# Patient Record
Sex: FEMALE | Race: WHITE | NOT HISPANIC OR LATINO | Employment: OTHER | URBAN - METROPOLITAN AREA
[De-identification: names, ages, dates, MRNs, and addresses within clinical notes are randomized per-mention and may not be internally consistent; named-entity substitution may affect disease eponyms.]

---

## 2017-10-17 ENCOUNTER — AMBULATORY - RIVER FALLS (OUTPATIENT)
Dept: FAMILY MEDICINE | Facility: CLINIC | Age: 77
End: 2017-10-17

## 2017-10-18 LAB
CHOLEST SERPL-MCNC: 156 MG/DL
CHOLEST/HDLC SERPL: 3 {RATIO}
CREAT SERPL-MCNC: 0.89 MG/DL (ref 0.6–0.93)
GLUCOSE BLD-MCNC: 113 MG/DL (ref 65–99)
HDLC SERPL-MCNC: 52 MG/DL
LDLC SERPL CALC-MCNC: 77 MG/DL
NONHDLC SERPL-MCNC: 104 MG/DL
TRIGL SERPL-MCNC: 167 MG/DL

## 2017-10-24 ENCOUNTER — OFFICE VISIT - RIVER FALLS (OUTPATIENT)
Dept: FAMILY MEDICINE | Facility: CLINIC | Age: 77
End: 2017-10-24

## 2017-10-24 ASSESSMENT — MIFFLIN-ST. JEOR: SCORE: 1392.6

## 2018-10-18 ENCOUNTER — AMBULATORY - RIVER FALLS (OUTPATIENT)
Dept: FAMILY MEDICINE | Facility: CLINIC | Age: 78
End: 2018-10-18

## 2018-10-19 LAB
CHOLEST SERPL-MCNC: 147 MG/DL
CHOLEST/HDLC SERPL: 2.9 {RATIO}
CREAT SERPL-MCNC: 0.91 MG/DL (ref 0.6–0.93)
GLUCOSE BLD-MCNC: 110 MG/DL (ref 65–99)
HBA1C MFR BLD: 6.1 %
HDLC SERPL-MCNC: 50 MG/DL
LDLC SERPL CALC-MCNC: 74 MG/DL
NONHDLC SERPL-MCNC: 97 MG/DL
TRIGL SERPL-MCNC: 152 MG/DL

## 2018-10-25 ENCOUNTER — OFFICE VISIT - RIVER FALLS (OUTPATIENT)
Dept: FAMILY MEDICINE | Facility: CLINIC | Age: 78
End: 2018-10-25

## 2018-10-25 ASSESSMENT — MIFFLIN-ST. JEOR: SCORE: 1386.47

## 2019-10-22 ENCOUNTER — AMBULATORY - RIVER FALLS (OUTPATIENT)
Dept: FAMILY MEDICINE | Facility: CLINIC | Age: 79
End: 2019-10-22

## 2019-10-23 ENCOUNTER — COMMUNICATION - RIVER FALLS (OUTPATIENT)
Dept: FAMILY MEDICINE | Facility: CLINIC | Age: 79
End: 2019-10-23

## 2019-10-23 LAB
BUN SERPL-MCNC: 19 MG/DL (ref 7–25)
BUN/CREAT RATIO - HISTORICAL: 18 (ref 6–22)
CALCIUM SERPL-MCNC: 9.8 MG/DL (ref 8.6–10.4)
CHLORIDE BLD-SCNC: 104 MMOL/L (ref 98–110)
CHOLEST SERPL-MCNC: 151 MG/DL
CHOLEST/HDLC SERPL: 3 {RATIO}
CO2 SERPL-SCNC: 26 MMOL/L (ref 20–32)
CREAT SERPL-MCNC: 1.05 MG/DL (ref 0.6–0.93)
EGFRCR SERPLBLD CKD-EPI 2021: 51 ML/MIN/1.73M2
GLUCOSE BLD-MCNC: 124 MG/DL (ref 65–99)
HBA1C MFR BLD: 6.2 %
HDLC SERPL-MCNC: 50 MG/DL
LDLC SERPL CALC-MCNC: 76 MG/DL
NONHDLC SERPL-MCNC: 101 MG/DL
POTASSIUM BLD-SCNC: 3.9 MMOL/L (ref 3.5–5.3)
SODIUM SERPL-SCNC: 140 MMOL/L (ref 135–146)
TRIGL SERPL-MCNC: 152 MG/DL

## 2019-10-29 ENCOUNTER — OFFICE VISIT - RIVER FALLS (OUTPATIENT)
Dept: FAMILY MEDICINE | Facility: CLINIC | Age: 79
End: 2019-10-29

## 2019-10-29 ASSESSMENT — MIFFLIN-ST. JEOR: SCORE: 1400.08

## 2020-09-21 ENCOUNTER — AMBULATORY - RIVER FALLS (OUTPATIENT)
Dept: FAMILY MEDICINE | Facility: CLINIC | Age: 80
End: 2020-09-21

## 2020-10-26 ENCOUNTER — AMBULATORY - RIVER FALLS (OUTPATIENT)
Dept: FAMILY MEDICINE | Facility: CLINIC | Age: 80
End: 2020-10-26

## 2020-10-27 LAB
BUN SERPL-MCNC: 20 MG/DL (ref 7–25)
BUN/CREAT RATIO - HISTORICAL: 19 (ref 6–22)
CALCIUM SERPL-MCNC: 9.6 MG/DL (ref 8.6–10.4)
CHLORIDE BLD-SCNC: 106 MMOL/L (ref 98–110)
CHOLEST SERPL-MCNC: 146 MG/DL
CHOLEST/HDLC SERPL: 2.6 {RATIO}
CO2 SERPL-SCNC: 29 MMOL/L (ref 20–32)
CREAT SERPL-MCNC: 1.03 MG/DL (ref 0.6–0.93)
EGFRCR SERPLBLD CKD-EPI 2021: 52 ML/MIN/1.73M2
GLUCOSE BLD-MCNC: 116 MG/DL (ref 65–99)
HBA1C MFR BLD: 6.1 %
HDLC SERPL-MCNC: 56 MG/DL
LDLC SERPL CALC-MCNC: 70 MG/DL
NONHDLC SERPL-MCNC: 90 MG/DL
POTASSIUM BLD-SCNC: 4.5 MMOL/L (ref 3.5–5.3)
SODIUM SERPL-SCNC: 142 MMOL/L (ref 135–146)
TRIGL SERPL-MCNC: 117 MG/DL

## 2020-10-28 ENCOUNTER — COMMUNICATION - RIVER FALLS (OUTPATIENT)
Dept: FAMILY MEDICINE | Facility: CLINIC | Age: 80
End: 2020-10-28

## 2020-11-02 ENCOUNTER — OFFICE VISIT - RIVER FALLS (OUTPATIENT)
Dept: FAMILY MEDICINE | Facility: CLINIC | Age: 80
End: 2020-11-02

## 2020-11-02 ASSESSMENT — MIFFLIN-ST. JEOR: SCORE: 1386.47

## 2021-10-28 ENCOUNTER — AMBULATORY - RIVER FALLS (OUTPATIENT)
Dept: FAMILY MEDICINE | Facility: CLINIC | Age: 81
End: 2021-10-28

## 2021-10-29 ENCOUNTER — COMMUNICATION - RIVER FALLS (OUTPATIENT)
Dept: FAMILY MEDICINE | Facility: CLINIC | Age: 81
End: 2021-10-29

## 2021-10-29 LAB
BUN SERPL-MCNC: 23 MG/DL (ref 7–25)
BUN/CREAT RATIO - HISTORICAL: 22 (ref 6–22)
CALCIUM SERPL-MCNC: 9.8 MG/DL (ref 8.6–10.4)
CHLORIDE BLD-SCNC: 103 MMOL/L (ref 98–110)
CHOLEST SERPL-MCNC: 156 MG/DL
CHOLEST/HDLC SERPL: 2.8 {RATIO}
CO2 SERPL-SCNC: 31 MMOL/L (ref 20–32)
CREAT SERPL-MCNC: 1.03 MG/DL (ref 0.6–0.93)
EGFRCR SERPLBLD CKD-EPI 2021: 52 ML/MIN/1.73M2
GLUCOSE BLD-MCNC: 106 MG/DL (ref 65–99)
HBA1C MFR BLD: 6.2 %
HDLC SERPL-MCNC: 56 MG/DL
LDLC SERPL CALC-MCNC: 76 MG/DL
NONHDLC SERPL-MCNC: 100 MG/DL
POTASSIUM BLD-SCNC: 4.5 MMOL/L (ref 3.5–5.3)
SODIUM SERPL-SCNC: 141 MMOL/L (ref 135–146)
TRIGL SERPL-MCNC: 143 MG/DL

## 2021-11-04 ENCOUNTER — TRANSFERRED RECORDS (OUTPATIENT)
Dept: MULTI SPECIALTY CLINIC | Facility: CLINIC | Age: 81
End: 2021-11-04

## 2021-11-04 ENCOUNTER — OFFICE VISIT - RIVER FALLS (OUTPATIENT)
Dept: FAMILY MEDICINE | Facility: CLINIC | Age: 81
End: 2021-11-04

## 2021-11-04 ASSESSMENT — MIFFLIN-ST. JEOR: SCORE: 1350.64

## 2022-01-04 ENCOUNTER — COMMUNICATION - RIVER FALLS (OUTPATIENT)
Dept: FAMILY MEDICINE | Facility: CLINIC | Age: 82
End: 2022-01-04

## 2022-02-12 VITALS
WEIGHT: 209 LBS | TEMPERATURE: 98.3 F | HEIGHT: 64 IN | HEART RATE: 68 BPM | BODY MASS INDEX: 35.68 KG/M2 | DIASTOLIC BLOOD PRESSURE: 81 MMHG | SYSTOLIC BLOOD PRESSURE: 132 MMHG

## 2022-02-12 VITALS
DIASTOLIC BLOOD PRESSURE: 74 MMHG | TEMPERATURE: 97.1 F | HEART RATE: 65 BPM | BODY MASS INDEX: 33.82 KG/M2 | WEIGHT: 198.1 LBS | HEIGHT: 64 IN | SYSTOLIC BLOOD PRESSURE: 178 MMHG

## 2022-02-12 VITALS
HEART RATE: 72 BPM | SYSTOLIC BLOOD PRESSURE: 130 MMHG | DIASTOLIC BLOOD PRESSURE: 72 MMHG | WEIGHT: 205.6 LBS | TEMPERATURE: 97.8 F | HEIGHT: 64 IN | BODY MASS INDEX: 35.1 KG/M2

## 2022-02-12 VITALS
HEIGHT: 64 IN | BODY MASS INDEX: 35.17 KG/M2 | DIASTOLIC BLOOD PRESSURE: 68 MMHG | TEMPERATURE: 97.9 F | HEART RATE: 72 BPM | SYSTOLIC BLOOD PRESSURE: 142 MMHG | DIASTOLIC BLOOD PRESSURE: 60 MMHG | WEIGHT: 206 LBS | SYSTOLIC BLOOD PRESSURE: 122 MMHG

## 2022-02-12 VITALS
DIASTOLIC BLOOD PRESSURE: 68 MMHG | SYSTOLIC BLOOD PRESSURE: 152 MMHG | TEMPERATURE: 97.6 F | HEIGHT: 64 IN | BODY MASS INDEX: 35.17 KG/M2 | WEIGHT: 206 LBS | HEART RATE: 76 BPM

## 2022-02-15 NOTE — NURSING NOTE
Vitals/Measurements Entered On:  10/28/2021 9:20 AM CDT    Performed On:  10/28/2021 9:20 AM CDT by Molly Doll LPN               Vital Signs   Systolic Blood Pressure :   138 mmHg (HI)    Diastolic Blood Pressure :   72 mmHg   Mean Arterial Pressure :   94 mmHg   BP Site :   Right arm   Molly Doll LPN - 10/28/2021 9:20 AM CDT

## 2022-02-15 NOTE — PROGRESS NOTES
Patient:   MENA POOL            MRN: [id number]            FIN: [id number]               Age:   77 years     Sex:  Female     :  1940   Associated Diagnoses:   Medicare annual wellness visit, subsequent; Hypertension; Mixed hyperlipidemia   Author:   Jason Barth MD      Visit Information      Date of Service: 10/24/2017 08:52 am  Performing Location: [facility]  Encounter#: [id number]      Primary Care Provider (PCP):  Jason Barth MD    NPI# [id number]   Visit type:  Medicare, annual wellness visit.    Accompanied by:  No one.    Source of history:  Self.    History limitation:  None.       Chief Complaint   10/24/2017 8:54 AM CDT   AWV, refill meds, flu shot.      Well Adult History   Well Adult History             The patient presents for Medicare well exam.  The patient's general health status is described as good.  The patient's diet is described as balanced.  Exercise: occasional.  Associated symptoms consist of none.  Last menstrual period: menopausal.  Medical encounters:.  Complaint:.  Additional pertinent history: occasional caffeine use, tobacco use none and occasional alcohol use.       mammogram:  due  Pap smear:  n/a  colonoscopy:  FIT done last year  lipid and diabetes screening:  up to date   immunizations:  up to date   other:  hypertension and hyperlipemia under control      Review of Systems   Constitutional:  No fever, No chills, No sweats, No weakness, No fatigue.    Eye:  No recent visual problem.    Ear/Nose/Mouth/Throat:  No decreased hearing, No nasal congestion, No sore throat.    Respiratory:  No shortness of breath, No cough.    Cardiovascular:  Negative, No chest pain, No palpitations, No peripheral edema.    Gastrointestinal:  No nausea, No vomiting, No diarrhea, No constipation, No heartburn.    Genitourinary:  No dysuria, No change in urine stream.    Hematology/Lymphatics:  No bruising tendency, No bleeding tendency.    Endocrine:  No cold  intolerance, No heat intolerance.    Immunologic:  Negative.    Musculoskeletal:  No back pain, No neck pain, No joint pain, No muscle pain.    Integumentary:  Rash, No dryness, No skin lesion.    Neurologic:  Alert and oriented X4,   Cognitive impairment: none  Year: [place]  [place]  [place]    Psychiatric:  No anxiety, No depression.    PHQ9 and CAGE reviewed and discussed with patient.      Hearing impairment:  none  ADL: independent  Fall risk:  low  Home safety:  no concerns    Advanced care planning:  completed      Health Status   Allergies:    Allergic Reactions (Selected)  Severity Not Documented  Nickel (No reactions were documented)   Medications:  (Selected)   Prescriptions  Prescribed  chlorthalidone 25 mg oral tablet: 1 tab(s) ( 25 mg ), PO, Daily, # 90 tab(s), 3 Refill(s), Type: Maintenance, Pharmacy: [id number], 1 tab(s) po daily  lisinopril 20 mg oral tablet: 1 tab(s) ( 20 mg ), po, daily, # 90 tab(s), 3 Refill(s), Type: Maintenance, Pharmacy: [id number], 1 tab(s) po daily  nifedipine ointment: nifedipine ointment, See Instructions, Instructions: Apply to anus tid, Supply, # 15 gm, 1 Refill(s), Type: Maintenance, Pharmacy: [facility], Apply to anus tid  simvastatin 40 mg oral tablet: 1 tab(s) ( 40 mg ), po, hs, # 90 tab(s), 3 Refill(s), Type: Maintenance, Pharmacy: [id number], 1 tab(s) po hs  Documented Medications  Documented  aspirin 81 mg oral tablet: 1 tab(s) ( 81 mg ), po, daily, 0 Refill(s)  multivitamin with minerals (w/ Iron): 0 Refill(s), Type: Maintenance   Problem list:    All Problems  BCC (basal cell carcinoma), face / ICD-9-.31 / Confirmed  BCC (basal cell carcinoma), face / ICD-9-.31 / Confirmed  Diverticulosis of Sigmoid / ICD-9-.10 / Confirmed  Dyslipidemia / SNOMED CT 416795228 / Confirmed  Obese / ICD-9-.00 / Probable  Osteopenia of the Elderly / ICD-9-.90 / Confirmed  Inactive: LE  Varicosities  Inactive: Basal Cell Carcinoma, Lip / ICD-9-.0  Resolved: Hypertension / SNOMED CT 72410155  Canceled: Elevated LDL cholesterol level / SNOMED CT 8668910066  Canceled: Family history of colonic polyps / SNOMED CT 8263931982  Canceled: High Cholesterol / ICD-9-.0  Canceled: Hyperlipidemia / SNOMED CT 515520412     Other Healthcare:         Histories   Past Medical History:    Active  Dyslipidemia (460418018)  Osteopenia of the Elderly (733.90)  Resolved  Hypertension (19173730):  Resolved.   Family History:    MI  Father ()  CVA - Cerebral infarction  Mother ()  Heart disease  Father ()  CA - Cancer of colon  Uncle (P)     Procedure history:    Extracapsular cataract extraction and insertion of intraocular lens (SNOMED CT 005163371) on 10/22/2015 at 75 Years.  Comments:  11/3/2015 9:11 AM - Jonelle Bowman  Left.  DEXA - Dual energy X-ray photon absorptiometry (SNOMED CT 715683979) on 10/15/2013 at 73 Years.  Comments:  10/25/2013 9:05 AM - Viktoriya Rojas CMA  Osteopenia in hip/spine - recheck in 3-5 yrs  Colonoscopy (SNOMED CT 366990536) on 2011 at 71 Years.  Comments:  10/17/2016 7:11 PM - Shweta Allen MA  Sedation:   MAC    2011 8:34 AM - Jason Portillo MD  Colonoscopy to the proximal ascending colon    2011 8:13 AM - Alexandria Engle MA  Normal colonoscopy, repeat in 5 years family hx colon polyps  Removal of Basal cell carcinoma from upper lip in  at 68 Years.  Colonoscopy (SNOMED CT 432882648) in  at 65 Years.  Comments:  2010 1:12 PM - Jaqueline Barrett  hyperplastic recheck 2010  [place] teeth extraction.   x2.   Social History:        Alcohol Assessment            Past      Tobacco Assessment            Past      Substance Abuse Assessment            Never      Employment and Education Assessment            Retired      Home and Environment Assessment            Marital status: .  Spouse/Partner name: Jossue.  Lives  with Spouse.        Physical Examination   Vital Signs   10/24/2017 8:54 AM CDT Temperature Tympanic 97.8 DegF  LOW    Peripheral Pulse Rate 72 bpm    Pulse Site Radial artery    HR Method Manual    Systolic Blood Pressure 150 mmHg  HI    Diastolic Blood Pressure 68 mmHg    Mean Arterial Pressure 95 mmHg    BP Site Right arm    BP Method Manual      Measurements from flowsheet : Measurements   10/24/2017 8:54 AM CDT Height Measured - Standard 64 in    Weight Measured - Standard 205.6 lb    BSA 2.05 m2    Body Mass Index 35.29 kg/m2      General:  Alert and oriented, No acute distress.    Eye:  Pupils are equal, round and reactive to light, Extraocular movements are intact, Normal conjunctiva.    HENT:  Normocephalic, Tympanic membranes are clear, Oral mucosa is moist, No pharyngeal erythema, No sinus tenderness.    Neck:  Supple, Non-tender, No carotid bruit, No lymphadenopathy, No thyromegaly.    Respiratory:  Lungs are clear to auscultation, Respirations are non-labored, Breath sounds are equal, No chest wall tenderness.    Cardiovascular:  Normal rate, Regular rhythm, No murmur, No gallop, Normal peripheral perfusion, No edema.    Gastrointestinal:  Soft, Non-tender, No organomegaly.    Musculoskeletal:  Normal range of motion, Normal strength, No swelling, No deformity.    Integumentary:  Warm, Dry, No rash.    Neurologic:  Alert, Oriented, No focal deficits.    Psychiatric:  Cooperative, Appropriate mood & affect.       Review / Management   Results review:  Lab results   10/17/2017 8:19 AM CDT Sodium Level 140 mmol/L    Potassium Level 4.1 mmol/L    Chloride Level 104 mmol/L    CO2 Level 27 mmol/L    Glucose Level 113 mg/dL  HI    BUN 21 mg/dL    Creatinine Level 0.89 mg/dL    BUN/Creat Ratio NOT APPLICABLE    eGFR 63 mL/min/1.73m2    eGFR African American 73 mL/min/1.73m2    Calcium Level 9.9 mg/dL    Cholesterol 156 mg/dL    Non-HDL Cholesterol 104    HDL 52 mg/dL    Cholesterol/HDL Ratio 3.0    LDL 77     Triglyceride 167 mg/dL  HI   .       Impression and Plan   Diagnosis     Medicare annual wellness visit, subsequent (LFO01-CP Z09).     Hypertension (MQU60-AD I10).     Mixed hyperlipidemia (OJL03-SU E78.2).     Course:  Progressing as expected.    Plan:  Counseled on health maintenance, diet, activity, BMI discussed, continue current medication.    Patient Instructions:       Counseled: Regarding diagnosis, Regarding medications, Smoking cessation, Verbalized understanding, Breast cancer, colon cancer, diabetes, lipid, HTN, obesity screening discussed and initiated, Risk factors for development of chronic disease and infirmities of ageing have been discussed and plans for minimizing and screening for these conditions have been discussed.  Plans in place for management of conditions identified.  The preventative screening checklist has been completed and reviewed with the patient and a copy has been filled in the electronic record..

## 2022-02-15 NOTE — NURSING NOTE
CAGE Assessment Entered On:  10/29/2019 12:50 PM CDT    Performed On:  10/29/2019 12:50 PM CDT by Shweta Allen MA               Assessment   Have you ever felt you should cut down on your drinking :   No   Have people annoyed you by criticizing your drinking :   No   Have you ever felt bad or guilty about your drinking :   No   Have you ever taken a drink first thing in the morning to steady your nerves or get rid of a hangover (Eye-opener) :   No   CAGE Score :   0    Shweta Allen MA - 10/29/2019 12:50 PM CDT

## 2022-02-15 NOTE — NURSING NOTE
Vital Signs Entered On:  11/2/2020 8:39 AM CST    Performed On:  11/2/2020 8:39 AM CST by Molly Doll LPN               Vital Signs   Systolic Blood Pressure :   152 mmHg (HI)    Diastolic Blood Pressure :   68 mmHg   Mean Arterial Pressure :   96 mmHg   BP Site :   Right arm   Molly Doll LPN - 11/2/2020 8:39 AM CST

## 2022-02-15 NOTE — NURSING NOTE
Vital Signs Entered On:  11/4/2021 2:24 PM CDT    Performed On:  11/4/2021 2:24 PM CDT by Shweta Allen MA               Vital Signs   Systolic Blood Pressure :   178 mmHg (HI)    Diastolic Blood Pressure :   74 mmHg   Mean Arterial Pressure :   109 mmHg   BP Site :   Right arm   Shweta Allen MA - 11/4/2021 2:24 PM CDT

## 2022-02-15 NOTE — NURSING NOTE
Per [facility], pt's BP rechecked and noted to be at 178/74.  Pt was instructed to check BP at home and send BP readings to [facility] via portal.  Pt in agreement

## 2022-02-15 NOTE — NURSING NOTE
Medicare Visit Entered On:  10/29/2019 12:59 PM CDT    Performed On:  10/29/2019 12:50 PM CDT by Tiffany CARVAJAL, Shweta               Summary   Chief Complaint :   AWV   Weight Measured :   209 lb(Converted to: 209 lb 0 oz, 94.80 kg)    Height Measured :   63.5 in(Converted to: 5 ft 3 in, 161.29 cm)    Body Mass Index :   36.44 kg/m2 (HI)    Body Surface Area :   2.06 m2   Systolic Blood Pressure :   152 mmHg (HI)    Diastolic Blood Pressure :   82 mmHg (HI)    Mean Arterial Pressure :   105 mmHg   Peripheral Pulse Rate :   68 bpm   BP Site :   Right arm   Pulse Site :   Radial artery   BP Method :   Manual   HR Method :   Manual   Temperature Tympanic :   98.3 DegF(Converted to: 36.8 DegC)    Shweta Allen MA - 10/29/2019 12:50 PM CDT   Health Status   Allergies Verified? :   Yes   Medication History Verified? :   Yes   Medical History Verified? :   Yes   Pre-Visit Planning Status :   Completed   Tobacco Use? :   Former smoker   Shweta Allen MA - 10/29/2019 12:50 PM CDT   Demographics   Last Name :   Swink   Address :   [address] [place] [address]   First Name :   Nena Soria Initial :   I   Responsible Party Date of Birth () :   1940 CST   City :   [place]   State :   [place]   Zip Code :   [address]   Contact Relationship to Patient (other) :   Patient   Tiffany CARVAJAL, Shweta - 10/29/2019 12:50 PM CDT   Providers Grid   Provider Name :    STEVE Lomax OD Anthony Novak, MD [facility]     Provider Specialty :    [facility]   [facility]   [facility]   [facility]     Comments :    Phone:  [phone]   Phone:  [phone]   Phone:  [phone]   Phone:  [phone]       Shweta Allen MA - 10/29/2019 12:50 PM CDT  Tiffany CARVAJAL, Shweta - 10/29/2019 12:50 PM CDT  Shweta Allen MA - 10/29/2019 12:50 PM CDT  Shweta Allen MA - 10/29/2019 12:50 PM CDT      Ancillary Services Grid   Name :    [facility]              Type of Service :     Pharmacy                Shweta Allen MA - 10/29/2019 12:50 PM CDT         Consents   Consent for Immunization Exchange :   Consent Granted   Consent for Immunizations to Providers :   Consent Granted   Shweta Allen MA - 10/29/2019 12:50 PM CDT   Meds / Allergies   (As Of: 10/29/2019 12:59:01 PM CDT)   Allergies (Active)   Nickel  Estimated Onset Date:   Unspecified ; Created By:   Arleen Du; Reaction Status:   Active ; Category:   Drug ; Substance:   Nickel ; Type:   Allergy ; Updated By:   Arleen Du; Reviewed Date:   10/25/2018 12:27 PM CDT        Medication List   (As Of: 10/29/2019 12:59:01 PM CDT)   Prescription/Discharge Order    chlorthalidone  :   chlorthalidone ; Status:   Prescribed ; Ordered As Mnemonic:   chlorthalidone 25 mg oral tablet ; Simple Display Line:   25 mg, 1 tab(s), PO, Daily, 30 tab(s), 0 Refill(s) ; Ordering Provider:   Jason Barth MD; Catalog Code:   chlorthalidone ; Order Dt/Tm:   10/15/2019 1:47:45 PM CDT          lisinopril  :   lisinopril ; Status:   Prescribed ; Ordered As Mnemonic:   lisinopril 20 mg oral tablet ; Simple Display Line:   20 mg, 1 tab(s), po, daily, 90 tab(s), 3 Refill(s) ; Ordering Provider:   Jason Barth MD; Catalog Code:   lisinopril ; Order Dt/Tm:   10/25/2018 12:50:36 PM CDT          simvastatin  :   simvastatin ; Status:   Prescribed ; Ordered As Mnemonic:   simvastatin 40 mg oral tablet ; Simple Display Line:   40 mg, 1 tab(s), po, hs, 90 tab(s), 3 Refill(s) ; Ordering Provider:   Jason Barth MD; Catalog Code:   simvastatin ; Order Dt/Tm:   10/25/2018 12:50:34 PM CDT            Home Meds    aspirin  :   aspirin ; Status:   Documented ; Ordered As Mnemonic:   aspirin 81 mg oral tablet ; Simple Display Line:   81 mg, 1 tab(s), po, daily ; Catalog Code:   aspirin ; Order Dt/Tm:   8/23/2010 1:00:17 PM CDT          multivitamin with minerals  :   multivitamin with minerals ; Status:   Documented ; Ordered As Mnemonic:    multivitamin with minerals (w/ Iron) ; Catalog Code:   multivitamin with minerals ; Order Dt/Tm:   8/25/2011 2:06:48 PM CDT            Social History   Social History   (As Of: 10/29/2019 12:59:01 PM CDT)   Alcohol:        Past   (Last Updated: 8/27/2012 2:06:10 PM CDT by Shweta Allen MA)          Tobacco:        Past   (Last Updated: 10/23/2014 11:15:04 AM CDT by Shweta Allen MA)          Substance Abuse:        Never   (Last Updated: 8/27/2012 2:06:21 PM CDT by Shweta Allen MA)          Employment/School:        Retired, Highest education level: High school.   (Last Updated: 10/26/2018 8:56:54 AM CDT by Caroline Wills)          Home/Environment:        Marital status: .  Spouse/Partner name: Jossue.  Lives with Spouse.   (Last Updated: 10/2/2013 10:29:31 AM CDT by Shweta Allen MA)          Nutrition/Health:        Type of diet: Regular.   (Last Updated: 10/29/2019 12:52:44 PM CDT by Shweta Allen MA)          Exercise:        Exercise frequency: occasional.  Exercise type: Walking.   (Last Updated: 10/29/2019 12:52:10 PM CDT by Shweta Allen MA)            Geriatric Depression Screening   Geriatric Depression Satisfied Life :   Yes   Geriatric Depression Dropped Activities :   No   Geriatric Depression Life Empty :   No   Geriatric Depression Bored :   No   Geriatric Depression Good Spirits :   Yes   Geriatric Depression Afraid Bad Things :   No   Geriatric Depression Feel Happy :   Yes   Geriatric Depression Feel Helpless :   No   Geriatric Depression Prefer to Stay Home :   No   Geriatric Depression Memory Problems :   No   Geriatric Depression Wonderful Be Alive :   Yes   Geriatric Depression Feel Worthless :   No   Geriatric Depression Situation Hopeless :   No   Geriatric Depression Others Better Off :   No   Geriatric Depression Full of Energy :   Yes   Geriatric Depression Total Score :   0    Shweta Allen MA - 10/29/2019 12:50 PM CDT   Hearing and Vision Screening   Audiogram Result  Right Ear :   Pass   Audiogram Result Left Ear :   Pass   Shweta Allen MA - 10/29/2019 12:50 PM CDT   Home Safety Screen   Emergency Numbers Kept/Updated :   Yes   Aware of Smoking Dangers :   Yes   Smoke Alarms/Fire Extinguisher Available :   Yes   Household Members Fire Safety Knowledge :   Yes   Floor Rugs Removed or Fastened :   Yes   Mats in Bathtub/Shower :   Yes   Stairway Rails or Banisters :   Yes   Outdoor Clutter Safety :   Yes   Indoor Clutter Safety :   Yes   Electrical Cord Safety :   Yes   Shweta Allen MA - 10/29/2019 12:50 PM CDT   Orr Fall Risk   History of Fall in Last 3 Months Orr :   No   Shweta Allen MA - 10/29/2019 12:50 PM CDT   Functional Assessment   Focused Functional Assessment Grid   Bathing :   Independent (2)   Dressing :   Independent (2)   Toileting :   Independent (2)   Transferring Bed or Chair :   Independent (2)   Feeding :   Independent (2)   Shweta Allen MA - 10/29/2019 12:50 PM CDT   Capable of Shopping :   Yes   Capable of Walking :   Yes   Capable of Housekeeping :   Yes   Capable of Managing Medications :   Yes   Capable of Handling Finances :   Yes   Shweta Allen MA - 10/29/2019 12:50 PM CDT

## 2022-02-15 NOTE — NURSING NOTE
Medicare Visit Entered On:  2021 1:24 PM CDT    Performed On:  2021 1:14 PM CDT by Shweta Allen MA               Summary   Chief Complaint :   AWV   Advance Directive :   No   Weight Measured :   198.1 lb(Converted to: 198 lb 2 oz, 89.857 kg)    Height Measured :   63.5 in(Converted to: 5 ft 3 in, 161.29 cm)    Body Mass Index :   34.54 kg/m2 (HI)    Body Surface Area :   2 m2   Systolic Blood Pressure :   151 mmHg (HI)    Diastolic Blood Pressure :   75 mmHg   Mean Arterial Pressure :   100 mmHg   Peripheral Pulse Rate :   65 bpm   BP Site :   Right arm   Pulse Site :   Radial artery   BP Method :   Electronic   HR Method :   Electronic   Temperature Tympanic :   97.1 DegF(Converted to: 36.2 DegC)  (LOW)    Tiffany CARVAJAL, Shweta - 2021 1:14 PM CDT   Health Status   Allergies Verified? :   Yes   Medication History Verified? :   Yes   Medical History Verified? :   Yes   Pre-Visit Planning Status :   Completed   Tobacco Use? :   Former smoker   Shewta Allen MA - 2021 1:14 PM CDT   Demographics   Last Name :   [place]   Address :   [address] [place] [address]   First Name :   Nena Soria Initial :   I   Responsible Party Date of Birth () :   1940 CST   City :   [place]   State :   [place]   Zip Code :   [id number]   Contact Relationship to Patient (other) :   Patient   Tiffany CARVAJAL, Shweta - 2021 1:14 PM CDT   Providers Grid   Provider Name :    STEVE Lomax OD Anthony Novak, MD Holly Reynolds-Buchen, P ABHUPINDER     Provider Specialty :    [facility]   [facility], [facility]   [facility]   [facility]     Comments :    Phone:  [phone]   Phone:  [phone]   Phone:  [phone]   Phone:  [phone]       Shweta Allen MA - 2021 1:14 PM CDT  Shweta Allen MA - 2021 1:14 PM CDT  Shweta Allen MA - 2021 1:14 PM CDT  Shweta Allen MA - 2021 1:14 PM CDT      Ancillary Services Grid   Name :    [facility]               Type of Service :    Pharmacy              Location :    [place], [place]                Tiffany CARVAJAL, Shweta - 11/4/2021 1:14 PM CDT         Consents   Consent for Immunization Exchange :   Consent Granted   Consent for Immunizations to Providers :   Consent Granted   Shweta Allen MA - 11/4/2021 1:14 PM CDT   Meds / Allergies   (As Of: 11/4/2021 1:24:34 PM CDT)   Allergies (Active)   Nickel  Estimated Onset Date:   Unspecified ; Created By:   Arleen Du; Reaction Status:   Active ; Category:   Drug ; Substance:   Nickel ; Type:   Allergy ; Updated By:   Arleen Du; Reviewed Date:   10/25/2018 12:27 PM CDT        Medication List   (As Of: 11/4/2021 1:24:34 PM CDT)   Prescription/Discharge Order    chlorthalidone  :   chlorthalidone ; Status:   Prescribed ; Ordered As Mnemonic:   chlorthalidone 25 mg oral tablet ; Simple Display Line:   25 mg, 1 tab(s), PO, Daily, 90 tab(s), 0 Refill(s) ; Ordering Provider:   Jason Barth MD; Catalog Code:   chlorthalidone ; Order Dt/Tm:   10/26/2021 11:21:09 AM CDT          lisinopril  :   lisinopril ; Status:   Prescribed ; Ordered As Mnemonic:   lisinopril 20 mg oral tablet ; Simple Display Line:   20 mg, 1 tab(s), po, daily, 90 tab(s), 0 Refill(s) ; Ordering Provider:   Jason Barth MD; Catalog Code:   lisinopril ; Order Dt/Tm:   10/26/2021 11:20:06 AM CDT          simvastatin  :   simvastatin ; Status:   Prescribed ; Ordered As Mnemonic:   simvastatin 40 mg oral tablet ; Simple Display Line:   1 tab(s), Oral, qhs, for 90 day(s), 90 tab(s), 0 Refill(s) ; Ordering Provider:   Jason Barth MD; Catalog Code:   simvastatin ; Order Dt/Tm:   10/26/2021 11:18:35 AM CDT            Home Meds    aspirin  :   aspirin ; Status:   Documented ; Ordered As Mnemonic:   aspirin 81 mg oral tablet ; Simple Display Line:   81 mg, 1 tab(s), po, daily ; Catalog Code:   aspirin ; Order Dt/Tm:   8/23/2010 1:00:17 PM CDT          multivitamin with minerals  :   multivitamin with  minerals ; Status:   Documented ; Ordered As Mnemonic:   multivitamin with minerals (w/ Iron) ; Catalog Code:   multivitamin with minerals ; Order Dt/Tm:   8/25/2011 2:06:48 PM CDT            Social History   Social History   (As Of: 11/4/2021 1:24:34 PM CDT)   Alcohol:        Current, Wine (5 oz), 1-2 times per year   (Last Updated: 11/4/2021 1:21:25 PM CDT by Shweta Allen MA)          Tobacco:        Former smoker, quit more than 30 days ago   Comments:  11/4/2021 1:21 PM - Shweta Allen MA: quit 1968   (Last Updated: 11/4/2021 1:21:07 PM CDT by Shweta Allen MA)          Electronic Cigarette/Vaping:        Electronic Cigarette Use: Never.   (Last Updated: 11/4/2021 1:15:26 PM CDT by Shweta Allen MA)          Substance Abuse:        Never   (Last Updated: 8/27/2012 2:06:21 PM CDT by Shweta Allen MA)          Employment/School:        Retired, Highest education level: High school.   (Last Updated: 10/26/2018 8:56:54 AM CDT by Caroline Wills)          Home/Environment:        Marital status: .  Spouse/Partner name: Jossue.  Lives with Spouse.   (Last Updated: 10/2/2013 10:29:31 AM CDT by Shweta Allen MA)          Nutrition/Health:        Type of diet: Regular.   (Last Updated: 10/29/2019 12:52:44 PM CDT by Shweta Allen MA)          Exercise:        Exercise frequency: occasional.  Exercise type: Walking.   (Last Updated: 10/29/2019 12:52:10 PM CDT by Shweta Allen MA)            Geriatric Depression Screening   Geriatric Depression Satisfied Life :   Yes   Geriatric Depression Dropped Activities :   No   Geriatric Depression Life Empty :   No   Geriatric Depression Bored :   No   Geriatric Depression Good Spirits :   Yes   Geriatric Depression Afraid Bad Things :   No   Geriatric Depression Feel Happy :   Yes   Geriatric Depression Feel Helpless :   No   Geriatric Depression Prefer to Stay Home :   No   Geriatric Depression Memory Problems :   No   Geriatric Depression Wonderful Be Alive :    Yes   Geriatric Depression Feel Worthless :   No   Geriatric Depression Situation Hopeless :   No   Geriatric Depression Others Better Off :   No   Geriatric Depression Full of Energy :   Yes   Geriatric Depression Total Score :   0    Tiffany CARVAJAL [facility] 11/4/2021 1:14 PM CDT   Hearing and Vision Screening   Audiogram Result Right Ear :   Pass   Audiogram Result Left Ear :   Pass   Vision Screen Comments :   Last eye exam 10/22/2021   Tiffany CARVAJAL [facility] 11/4/2021 1:14 PM CDT   Home Safety Screen   Emergency Numbers Kept/Updated :   Yes   Aware of Smoking Dangers :   Yes   Smoke Alarms/Fire Extinguisher Available :   Yes   Household Members Fire Safety Knowledge :   Yes   Floor Rugs Removed or Fastened :   Yes   Mats in Bathtub/Shower :   Yes   Stairway Rails or Banisters :   Yes   Outdoor Clutter Safety :   Yes   Indoor Clutter Safety :   Yes   Electrical Cord Safety :   Yes   Tiffany CARVAJAL [facility] 11/4/2021 1:14 PM CDT   Advance Directives   Advance Directive :   No   Tiffany CARVAJAL [facility] 11/4/2021 1:14 PM CDT   Orr Fall Risk   History of Fall in Last 3 Months Orr :   No   Tiffany CARVAJAL [facility] 11/4/2021 1:14 PM CDT   Functional Assessment   Focused Functional Assessment Grid   Bathing :   Independent (2)   Dressing :   Independent (2)   Toileting :   Independent (2)   Transferring Bed or Chair :   Independent (2)   Feeding :   Independent (2)   Tiffany CARVAJAL [facility] 11/4/2021 1:14 PM CDT   Capable of Shopping :   Yes   Capable of Walking :   Yes   Capable of Housekeeping :   Yes   Capable of Managing Medications :   Yes   Capable of Handling Finances :   Yes   Tiffany CARVAJAL [facility] 11/4/2021 1:14 PM CDT

## 2022-02-15 NOTE — PROGRESS NOTES
Patient:   MENA POOL            MRN: [id number]            FIN: [id number]               Age:   78 years     Sex:  Female     :  1940   Associated Diagnoses:   Medicare annual wellness visit, initial; Dyslipidemia; Hypertension; Obese   Author:   Jason Barth MD      Visit Information      Care Providers  (10/25/2018 12:25 pm)  Dr. Mclean, Dentist  Dr. Hilliard, Optometrist  Dr. Ibanez, Ophthalmologist  Dr. Gabino Fernandez, Dermatologist  Ancillary Services  (10/25/2018 12:25 pm)  [facility]        Current Visit Date:  10/25/2018  Last AWV Date:  10/18/2016  Initial AWV Date:  2011          Chief Complaint   10/25/2018 12:25 PM CDT  AWV   Patient also needs medication refill.       History of Present Illness             The patient presents for Medicare annual wellness exam.  The patient's general health status is described as unchanged and stable.  The patient's diet is described as balanced.  Exercise occasional.  Associated symptoms consist of difficulty with movement (not lifting, not reaching, not standing upright, not turning), joint stiffness, denies abdominal pain, denies chest pain, denies constipation, denies shortness of breath and denies weight loss.        Review of Systems   Ear/Nose/Mouth/Throat:  Hearing is evaluated.    See completed Health History for Review of Systems   Psychiatric:  GDS noted.       Health Status   Allergies:    Allergic Reactions (Selected)  Severity Not Documented  Nickel (No reactions were documented)   Medications:  (Selected)   Prescriptions  Prescribed  chlorthalidone 25 mg oral tablet: 1 tab(s) ( 25 mg ), PO, Daily, # 90 tab(s), 3 Refill(s), Type: Maintenance, Pharmacy: [id number], 1 tab(s) po daily  lisinopril 20 mg oral tablet: 1 tab(s) ( 20 mg ), po, daily, # 90 tab(s), 3 Refill(s), Type: Maintenance, Pharmacy: [id number], 1 tab(s) po daily  simvastatin 40 mg oral tablet: 1 tab(s) ( 40 mg ), po, hs, #  90 tab(s), 3 Refill(s), Type: Maintenance, Pharmacy: [id number], 1 tab(s) po hs  Documented Medications  Documented  aspirin 81 mg oral tablet: 1 tab(s) ( 81 mg ), po, daily, 0 Refill(s)  multivitamin with minerals (w/ Iron): 0 Refill(s), Type: Maintenance,    Medications          *denotes recorded medication          *aspirin 81 mg oral tablet: 81 mg, 1 tab(s), po, daily.          chlorthalidone 25 mg oral tablet: 25 mg, 1 tab(s), PO, Daily, 90 tab(s), 3 Refill(s).          lisinopril 20 mg oral tablet: 20 mg, 1 tab(s), po, daily, 90 tab(s), 3 Refill(s).          *multivitamin with minerals (w/ Iron): 0 Refill(s), Type: Maintenance.          simvastatin 40 mg oral tablet: 40 mg, 1 tab(s), po, hs, 90 tab(s), 3 Refill(s).       Problem list:    All Problems  BCC (basal cell carcinoma), face / ICD-9-.31 / Confirmed  BCC (basal cell carcinoma), face / ICD-9-.31 / Confirmed  Diverticulosis of Sigmoid / ICD-9-.10 / Confirmed  Dyslipidemia / SNOMED CT 106086283 / Confirmed  Obese / ICD-9-.00 / Probable  Osteopenia of the Elderly / ICD-9-.90 / Confirmed   Medicare Assessments      Orr Fall Risk  (10/25/2018 12:25 pm)       History of Fall in Last 3 Months Orr:  No     Functional Assessment  (10/25/2018 12:25 pm)       Bathing ADL Index:  Independent (2)       Dressing ADL Index:  Independent (2)       Toileting ADL Index:  Independent (2)       Transferring Bed or Chair ADL Index:  Independent (2)       Continence ADL Index:  Independent (2)       Feeding ADL Index:  Independent (2)       ADL Index Score:  12     Geriatric Depression Screen  (10/25/2018 12:25 pm)       Geriatric Depression Satisfied Life:  Yes       Geriatric Depression Dropped Activities:  No       Geriatric Depression Life Empty:  No       Geriatric Depression Bored:  No       Geriatric Depression Good Spirits:  Yes       Geriatric Depression Afraid Bad Things:  No       Geriatric Depression Feel Happy:   Yes       Geriatric Depression Feel Helpless:  No       Geriatric Depression Prefer to Stay Home:  No       Geriatric Depression Memory Problems:  No       Geriatric Depression Wonderful Be Alive:  Yes       Geriatric Depression Feel Worthless:  No       Geriatric Depression Situation Hopeless:  No       Geriatric Depression Others Better Off:  No       Geriatric Depression Full of Energy:  Yes       Geriatric Depression Total Score:  0     Hearing Screen  (10/25/2018 12:25 pm)       Ear, Right Audiogram:  Pass       Ear, Left Audiogram:  Pass     Home Safety Screen  (10/25/2018 12:25 pm)       Emergency Numbers Kept/Updated:  Yes       Aware of Smoking Dangers:  Yes       Smoke Alarms/Fire Extinguisher Available:  Yes       Household Members Fire Safety Knowledge:  Yes       Firearms Unloaded and Secure:  Yes       Floor Rugs Removed or Fastened:  Yes       Mats in Bathtub/Shower:  Yes       Stairway Rails or Banisters:  Yes       Outdoor Clutter Safety:  Yes       Indoor Clutter Safety:  Yes       Electrical Cord Safety:  Yes     Nutritional Health Screen   No qualifying data available.   Vision Screen  (10/25/2018 12:25 pm)       Vision Screen Comments:  Goes to Dr Hilliard for eye care     ADL's and Safety reviewed with patient.      Histories   Past Medical History:    Active  Dyslipidemia (913571417)  Osteopenia of the Elderly (733.90)  Resolved  Hypertension (77290699):  Resolved.   Family History:    MI  Father ()  CVA - Cerebral infarction  Mother ()  Heart disease  Father ()  CA - Cancer of colon  Uncle (P)     Procedure history:    Extracapsular cataract extraction and insertion of intraocular lens ([id number]) on 10/22/2015 at 75 Years.  Comments:  11/3/2015 9:11 AM - Jonelle Bowman.  DEXA - Dual energy X-ray photon absorptiometry ([id number]) on 10/15/2013 at 73 Years.  Comments:  10/25/2013 9:05 AM - Viktoriya Rojas CMA  Osteopenia in hip/spine - recheck in 3-5  yrs  Colonoscopy ([id number]) on 2011 at 71 Years.  Comments:  10/17/2016 7:11 PM - Shweta Allen MA  Sedation:   MAC    2011 8:34 AM - Jason Portillo MD  Colonoscopy to the proximal ascending colon    2011 8:13 AM - Alexandria Engle MA  Normal colonoscopy, repeat in 5 years family hx colon polyps  Removal of Basal cell carcinoma from upper lip in  at 68 Years.  Colonoscopy ([id number]) in  at 65 Years.  Comments:  2010 1:12 PM - Jaqueline Barrett  hyperplastic recheck 2010  [place] teeth extraction.   x2.   Social History:        Alcohol Assessment            Past      Tobacco Assessment            Past      Substance Abuse Assessment            Never      Employment and Education Assessment            Retired      Home and Environment Assessment            Marital status: .  Spouse/Partner name: Jossue.  Lives with Spouse.        Physical Examination   Vital Signs   10/25/2018 12:25 PM CDT Temperature Tympanic 97.9 DegF    Peripheral Pulse Rate 72 bpm    Pulse Site Radial artery    HR Method Manual    Systolic Blood Pressure 122 mmHg    Diastolic Blood Pressure 68 mmHg    Mean Arterial Pressure 86 mmHg    BP Site Right arm    BP Method Manual      Measurements from flowsheet : Measurements   10/25/2018 12:25 PM CDT Height Measured - Standard 63.5 in    Weight Measured - Standard 206 lb    BSA 2.04 m2    Body Mass Index 35.91 kg/m2  HI      General:  Alert and oriented, No acute distress.    Eye:  Pupils are equal, round and reactive to light, Pupils are equal, round and reactive to light, Extraocular movements are intact, Normal conjunctiva, Normal conjunctiva.    HENT:  Normocephalic, Tympanic membranes are clear, Oral mucosa is moist, No pharyngeal erythema, No sinus tenderness.    Neck:  Supple, Non-tender, No carotid bruit, No lymphadenopathy, No thyromegaly, right carotid bruit.    Respiratory:  Lungs are clear to auscultation, Respirations are non-labored, Breath sounds are  equal, No chest wall tenderness.    Cardiovascular:  Normal rate, Regular rhythm, No murmur, No gallop, Normal peripheral perfusion, No edema.    Breast:  No mass, No tenderness, No discharge.    Gastrointestinal:  Soft, Non-tender, Non-distended, Normal bowel sounds, No organomegaly.    Genitourinary:  No costovertebral angle tenderness.    Musculoskeletal:  Normal range of motion, Normal strength, No swelling, No deformity, Normal gait.    Integumentary:  Warm, Dry, No rash.    Neurologic:  Alert, Oriented, Normal sensory, Normal motor function, No focal deficits, Normal deep tendon reflexes.    Cognition and Speech:  Oriented, Speech clear and coherent, Functional cognition intact.    Psychiatric:  Cooperative, Appropriate mood & affect, Normal judgment, Patient's GDS and CAGE questionnaire reviewed and discussed with patient. .       Review / Management   Results review:  Lab results   10/18/2018 8:02 AM CDT Sodium Level 141 mmol/L    Potassium Level 4.2 mmol/L    Chloride Level 103 mmol/L    CO2 Level 33 mmol/L  HI    Glucose Level 110 mg/dL  HI    BUN 22 mg/dL    Creatinine Level 0.91 mg/dL    BUN/Creat Ratio NOT APPLICABLE    eGFR 61 mL/min/1.73m2    eGFR African American 71 mL/min/1.73m2    Calcium Level 9.8 mg/dL    Hgb A1c 6.1  HI    Cholesterol 147 mg/dL    Non-HDL Cholesterol 97    HDL 50 mg/dL  LOW    Cholesterol/HDL Ratio 2.9    LDL 74    Triglyceride 152 mg/dL  HI   .       Impression and Plan   Diagnosis   Course:  Progressing as expected.    Plan:  Discussed  preventative services.  Appropriate weight and diet discussed.  Discussed Advance Life Directives.         Procedure: Left great toe nail removed. Keep area clean and dry, watch for signs of infections. .    Preventative services needed::  Preventative services checklist reviewed, updated and copy given to patient.  Please see scanned document.         HIV risk screening: No.    Patient Instructions:          Limitations: Limit caffeine intake,  Limit alcohol consumption.         Counseled: Patient, Regarding treatment, Regarding medications, Diet, Activity, Verbalized understanding.    Summary:  Labs reviewed with patient, Stable, continue on current medication. Refills of medication sent to Pharmacy. Follow up in one year for annual exam. Fasting labs do one week before visit..    Diagnosis     Medicare annual wellness visit, initial (ZEF58-JC Z00.00).     Dyslipidemia (LBC24-QC E78.2).     Hypertension (GWL25-SC I10).     Obese (AFR09-UC E66.09).     Course:  Progressing as expected.    Plan:  continue current medication.    Total time spent with patient and coordination of care:  30  minutes  I, Molly Doll LPN, acted solely as a scribe for, and in the presence of Dr. Jason Barth who performed the services.

## 2022-02-15 NOTE — TELEPHONE ENCOUNTER
---------------------  From: Jason Barth MD   To: MENA POOL    Sent: 10/29/2021 8:25:30 AM CDT  Subject: Patient Message - Results Notification      Your tests look good.  We will discuss the results at your upcoming visit.      Results:  Date Result Name Ind Value Ref Range   10/28/2021 8:11 AM Cholesterol  156 mg/dL ( - <200)   10/28/2021 8:11 AM HDL  56 mg/dL (> OR = 50 - )   10/28/2021 8:11 AM Triglyceride  143 mg/dL ( - <150)   10/28/2021 8:11 AM LDL  76    10/28/2021 8:11 AM Cholesterol/HDL Ratio  2.8 ( - <5.0)   10/28/2021 8:11 AM Non-HDL Cholesterol  100 ( - <130)   10/28/2021 8:11 AM Glucose Level ((H)) 106 mg/dL (65 - 99)   10/28/2021 8:11 AM BUN  23 mg/dL (7 - 25)   10/28/2021 8:11 AM Creatinine Level ((H)) 1.03 mg/dL (0.60 - 0.93)   10/28/2021 8:11 AM eGFR ((L)) 52 mL/min/1.73m2 (> OR = 60 - )   10/28/2021 8:11 AM eGFR African American  60 mL/min/1.73m2 (> OR = 60 - )   10/28/2021 8:11 AM BUN/Creat Ratio  22 (6 - 22)   10/28/2021 8:11 AM Sodium Level  141 mmol/L (135 - 146)   10/28/2021 8:11 AM Potassium Level  4.5 mmol/L (3.5 - 5.3)   10/28/2021 8:11 AM Chloride Level  103 mmol/L (98 - 110)   10/28/2021 8:11 AM CO2 Level  31 mmol/L (20 - 32)   10/28/2021 8:11 AM Calcium Level  9.8 mg/dL (8.6 - 10.4)   10/28/2021 8:11 AM Hgb A1c ((H)) 6.2 ( - <5.7)

## 2022-02-15 NOTE — TELEPHONE ENCOUNTER
---------------------  From: Jason Barth MD   To: MENA POOL    Sent: 10/23/2019 8:11:36 AM CDT  Subject: Patient Message - Results Notification       Your tests look good.  We will discuss the results at your upcoming visit.     Results:  Date Result Name Ind Value Ref Range   10/22/2019 8:15 AM Sodium Level  140 mmol/L (135 - 146)   10/22/2019 8:15 AM Potassium Level  3.9 mmol/L (3.5 - 5.3)   10/22/2019 8:15 AM Chloride Level  104 mmol/L (98 - 110)   10/22/2019 8:15 AM CO2 Level  26 mmol/L (20 - 32)   10/22/2019 8:15 AM Glucose Level ((H)) 124 mg/dL (65 - 99)   10/22/2019 8:15 AM BUN  19 mg/dL (7 - 25)   10/22/2019 8:15 AM Creatinine Level ((H)) 1.05 mg/dL (0.60 - 0.93)   10/22/2019 8:15 AM BUN/Creat Ratio  18 (6 - 22)   10/22/2019 8:15 AM eGFR ((L)) 51 mL/min/1.73m2 (> OR = 60 - )   10/22/2019 8:15 AM Calcium Level  9.8 mg/dL (8.6 - 10.4)   10/22/2019 8:15 AM Hgb A1c ((H)) 6.2 ( - <5.7)   10/22/2019 8:15 AM Cholesterol  151 mg/dL ( - <200)   10/22/2019 8:15 AM Non-HDL Cholesterol  101 ( - <130)   10/22/2019 8:15 AM HDL ((L)) 50 mg/dL (>50 - )   10/22/2019 8:15 AM Cholesterol/HDL Ratio  3.0 ( - <5.0)   10/22/2019 8:15 AM LDL  76    10/22/2019 8:15 AM Triglyceride ((H)) 152 mg/dL ( - <150)

## 2022-02-15 NOTE — TELEPHONE ENCOUNTER
Symbol;ortbl;\red0\green0\blue0;\hjm528\scjxg404\cuso318;}enerator TX_[id number];}\zzyxxa4061\pard\plain\f0\fs20\cb2\chshdng0\chcfpat0\chcbpat2 ------------------------------------------\par From: [facility]\par To: Jason Barth MD\par Sent: October 14, 2019 1:44:09 PM CDT\par Subject: Medication Management\par Due: October 15, 2019 1:44:09 PM CDT\par \par\b ** On Hold Pending Signature **\par\b0 Drug: chlorthalidone (chlorthalidone 25 mg oral tablet)  TAKE ONE TABLET BY MOUTH EVERY DAY\par Quantity: 90 unknown unit\par Days Supply: 90\par Refills: 3\par Substitutions Allowed\par Notes from Pharmacy: \par \par Dispensed Drug: chlorthalidone (chlorthalidone 25 mg oral tablet)  TAKE ONE TABLET BY MOUTH EVERY DAY\par Quantity: 90 unknown unit\par Days Supply: 90\par Refills: 3\par Substitutions Allowed\par Notes from Pharmacy: \par ------------------------------------------\par\f1\par}---------------------  From: Jennifer Bates CMA   To: [facility]    Sent: 10/14/2019 8:44:07 PM CDT  Subject: Medication Management     ** Not Approved: will refill at upcoming appt **  chlorthalidone (CHLORTHALIDONE 25MG TABS)  TAKE ONE TABLET BY MOUTH EVERY DAY  Qty:  90 unknown unit        Days Supply:  90        Refills:  3          Substitutions Allowed     Route To Pharmacy - [facility]   Signed by Jennifer Bates CMA

## 2022-02-15 NOTE — TELEPHONE ENCOUNTER
Entered by Gabby Taylor on October 19, 2020 9:27:47 AM CDT  PCP:   KERMIT    Medication:   Simvastatin   Last Filled:  10/29/19    Quantity:  90  Refills:  3    Date of last office visit and reason:   10/29/19 AWV  Date of last labs pertaining to condition:  _    Note:  filled for #30. Patient has AWV scheduled for 11/2/20 per chart     Return to Clinic order placed?  Yes.     Resource:   _  Phone:   _          ------------------------------------------  From: [facility]  To: Jason Barth MD  Sent: October 17, 2020 9:25:53 AM CDT  Subject: Medication Management  Due: October 8, 2020 2:04:31 PM CDT     ** On Hold Pending Signature **     Drug: simvastatin (simvastatin 40 mg oral tablet), TAKE ONE TABLET BY MOUTH AT BEDTIME  Quantity: 90 unknown unit  Days Supply: 90  Refills: 3  Substitutions Allowed  Notes from Pharmacy:     Dispensed Drug: simvastatin (simvastatin 40 mg oral tablet), TAKE ONE TABLET BY MOUTH AT BEDTIME  Quantity: 90 unknown unit  Days Supply: 90  Refills: 3  Substitutions Allowed  Notes from Pharmacy:  ---------------------------------------------------------------  From: Gabby Taylor   To: [facility]    Sent: 10/19/2020 9:28:08 AM CDT  Subject: Medication Management     ** Submitted: **  Order:simvastatin (simvastatin 40 mg oral tablet)  1 tab(s)  Oral  qhs  Qty:  30 tab(s)        Refills:  0          Substitutions Allowed     Route To Pharmacy - [facility]    Signed by Gabby Taylor  10/19/2020 2:27:00 PM [facility]    ** Submitted: **  Complete:simvastatin (simvastatin 40 mg oral tablet)   Signed by Gabby Taylor  10/19/2020 2:28:00 PM [facility]    ** Not Approved:  **  simvastatin (SIMVASTATIN 40MG TABS)  TAKE ONE TABLET BY MOUTH AT BEDTIME  Qty:  90 unknown unit        Days Supply:  90        Refills:  3          Substitutions Allowed     Route To Pharmacy - [facility] [place]   Signed by  Viviane/Gabby CARVAJAL

## 2022-02-15 NOTE — NURSING NOTE
Quick Intake Entered On:  10/29/2019 1:33 PM CDT    Performed On:  10/29/2019 1:33 PM CDT by Tiffany CARVAJAL, Shweta               Summary   Height Measured :   63.5 in(Converted to: 5 ft 3 in, 161.29 cm)    Shweta Allen MA - 10/29/2019 1:33 PM CDT   More Vitals   Systolic Blood Pressure Repeat :   132 mmHg   Diastolic Blood Pressure Repeat :   81 mmHg   BP Site Repeat :   Right arm   Shweta Allen MA - 10/29/2019 1:33 PM CDT

## 2022-02-15 NOTE — NURSING NOTE
CAGE Assessment Entered On:  11/4/2021 1:14 PM CDT    Performed On:  11/4/2021 1:14 PM CDT by Shweta Allen MA               Assessment   Have you ever felt you should cut down on your drinking :   No   Have people annoyed you by criticizing your drinking :   No   Have you ever felt bad or guilty about your drinking :   No   Have you ever taken a drink first thing in the morning to steady your nerves or get rid of a hangover (Eye-opener) :   No   CAGE Score :   0    Shweta Allen MA - 11/4/2021 1:14 PM CDT

## 2022-02-15 NOTE — NURSING NOTE
Medicare Visit Entered On:  11/2/2020 8:04 AM CST    Performed On:  11/2/2020 7:56 AM CST by Molly Doll LPN               Summary   Chief Complaint :   AWV-medicare, Medication refills.    Weight Measured :   206 lb(Converted to: 206 lb 0 oz, 93.440 kg)    Height Measured :   63.5 in(Converted to: 5 ft 3 in, 161.29 cm)    Body Mass Index :   35.91 kg/m2 (HI)    Body Surface Area :   2.04 m2   Systolic Blood Pressure :   146 mmHg (HI)    Diastolic Blood Pressure :   70 mmHg   Mean Arterial Pressure :   95 mmHg   Peripheral Pulse Rate :   76 bpm   BP Site :   Right arm   Pulse Site :   Radial artery   BP Method :   Electronic   HR Method :   Manual   Temperature Tympanic :   97.6 DegF(Converted to: 36.4 DegC)  (LOW)    Molly Doll LPN - 11/2/2020 7:56 AM CST   Health Status   Allergies Verified? :   Yes   Medication History Verified? :   Yes   Pre-Visit Planning Status :   Completed   Tobacco Use? :   Heavy tobacco smoker   Molly Doll LPN - 11/2/2020 7:56 AM CST   Consents   Consent for Immunization Exchange :   Consent Granted   Consent for Immunizations to Providers :   Consent Granted   Molly Doll LPN - 11/2/2020 7:56 AM CST   Problems   (As Of: 11/2/2020 8:04:01 AM CST)   Problems(Active)    BCC (basal cell carcinoma), face (SNOMED CT  :1227717301 )  Name of Problem:   BCC (basal cell carcinoma), face ; Onset Date:   8/13/2013 ; Recorder:   Jason Barth MD; Confirmation:   Confirmed ; Classification:   Medical ; Code:   1716016512 ; Contributor System:   Spireon ; Last Updated:   10/26/2018 8:23 AM CDT ; Life Cycle Status:   Active ; Responsible Provider:   Jason Barth MD; Vocabulary:   SNOMED CT        Diverticulosis of sigmoid colon (SNOMED CT  :6563614311 )  Name of Problem:   Diverticulosis of sigmoid colon ; Onset Date:   9/22/2011 ; Recorder:   Alexandria Engle MA; Confirmation:   Confirmed ; Classification:   Medical ; Code:   6504096062 ; Contributor System:    PowerChart ; Last Updated:   10/26/2018 8:22 AM CDT ; Life Cycle Status:   Active ; Vocabulary:   SNOMED CT        Dyslipidemia (SNOMED CT  :720263903 )  Name of Problem:   Dyslipidemia ; Recorder:   Jaqueline Barrett; Confirmation:   Confirmed ; Classification:   Medical ; Code:   222626804 ; Contributor System:   PowerChart ; Last Updated:   10/1/2015 1:40 PM CDT ; Life Cycle Date:   8/19/2010 ; Life Cycle Status:   Active ; Vocabulary:   SNOMED CT        Obese (SNOMED CT  :2719558571 )  Name of Problem:   Obese ; Recorder:   SYSTEM; Confirmation:   Probable ; Classification:   Medical ; Code:   3864579201 ; Contributor System:   PowerChart ; Last Updated:   10/26/2018 8:20 AM CDT ; Life Cycle Status:   Active ; Vocabulary:   SNOMED CT        Osteopenia of the elderly (SNOMED CT  :43840887 )  Name of Problem:   Osteopenia of the elderly ; Recorder:   Shweta Allen MA; Confirmation:   Confirmed ; Classification:   Medical ; Code:   66682983 ; Contributor System:   PowerChart ; Last Updated:   10/26/2018 8:21 AM CDT ; Life Cycle Status:   Active ; Vocabulary:   SNOMED CT        Tobacco user (SNOMED CT  :242563918 )  Name of Problem:   Tobacco user ; Recorder:   SYSTEM; Confirmation:   Probable ; Classification:   Medical ; Code:   750568405 ; Last Updated:   10/26/2018 8:55 AM CDT ; Life Cycle Date:   10/26/2018 ; Life Cycle Status:   Active ; Vocabulary:   SNOMED CT          Meds / Allergies   (As Of: 11/2/2020 8:04:01 AM CST)   Allergies (Active)   Nickel  Estimated Onset Date:   Unspecified ; Created By:   Arleen Du; Reaction Status:   Active ; Category:   Drug ; Substance:   Nickel ; Type:   Allergy ; Updated By:   Arleen Du; Reviewed Date:   10/25/2018 12:27 PM CDT        Medication List   (As Of: 11/2/2020 8:04:01 AM CST)   Prescription/Discharge Order    chlorthalidone  :   chlorthalidone ; Status:   Prescribed ; Ordered As Mnemonic:   chlorthalidone 25 mg oral tablet ; Simple Display Line:   25  mg, 1 tab(s), PO, Daily, 90 tab(s), 3 Refill(s) ; Ordering Provider:   Jason Barth MD; Catalog Code:   chlorthalidone ; Order Dt/Tm:   10/29/2019 1:15:36 PM CDT          lisinopril  :   lisinopril ; Status:   Prescribed ; Ordered As Mnemonic:   lisinopril 20 mg oral tablet ; Simple Display Line:   20 mg, 1 tab(s), po, daily, 90 tab(s), 3 Refill(s) ; Ordering Provider:   Jason Barth MD; Catalog Code:   lisinopril ; Order Dt/Tm:   10/29/2019 1:15:47 PM CDT          simvastatin  :   simvastatin ; Status:   Prescribed ; Ordered As Mnemonic:   simvastatin 40 mg oral tablet ; Simple Display Line:   1 tab(s), Oral, qhs, 30 tab(s), 0 Refill(s) ; Ordering Provider:   Jason Barth MD; Catalog Code:   simvastatin ; Order Dt/Tm:   10/19/2020 9:27:51 AM CDT            Home Meds    aspirin  :   aspirin ; Status:   Documented ; Ordered As Mnemonic:   aspirin 81 mg oral tablet ; Simple Display Line:   81 mg, 1 tab(s), po, daily ; Catalog Code:   aspirin ; Order Dt/Tm:   8/23/2010 1:00:17 PM CDT          multivitamin with minerals  :   multivitamin with minerals ; Status:   Documented ; Ordered As Mnemonic:   multivitamin with minerals (w/ Iron) ; Catalog Code:   multivitamin with minerals ; Order Dt/Tm:   8/25/2011 2:06:48 PM CDT            Geriatric Depression Screening   Geriatric Depression Satisfied Life :   Yes   Geriatric Depression Dropped Activities :   No   Geriatric Depression Life Empty :   No   Geriatric Depression Bored :   No   Geriatric Depression Good Spirits :   Yes   Geriatric Depression Afraid Bad Things :   No   Geriatric Depression Feel Happy :   Yes   Geriatric Depression Feel Helpless :   No   Geriatric Depression Prefer to Stay Home :   No   Geriatric Depression Memory Problems :   No   Geriatric Depression Wonderful Be Alive :   Yes   Geriatric Depression Feel Worthless :   No   Geriatric Depression Situation Hopeless :   No   Geriatric Depression Others Better Off :   No   Geriatric  Depression Full of Energy :   Yes   Geriatric Depression Total Score :   0    Molly Doll LPN 11/2/2020 7:56 AM CST   Home Safety Screen   Emergency Numbers Kept/Updated :   Yes   Aware of Smoking Dangers :   Yes   Smoke Alarms/Fire Extinguisher Available :   Yes   Floor Rugs Removed or Fastened :   Yes   Mats in Bathtub/Shower :   Yes   Stairway Rails or Banisters :   Yes   Outdoor Clutter Safety :   Yes   Indoor Clutter Safety :   Yes   Electrical Cord Safety :   Yes   Molly Doll LPN 11/2/2020 7:56 AM CST   Advance Directives   Advance Directive :   No   Molly Doll LPN 11/2/2020 7:56 AM CST   Orr Fall Risk   History of Fall in Last 3 Months Orr :   No   Molly Doll LPN 11/2/2020 7:56 AM CST   Functional Assessment   Focused Functional Assessment Grid   Bathing :   Independent (2)   Dressing :   Independent (2)   Toileting :   Independent (2)   Transferring Bed or Chair :   Independent (2)   Continence :   Independent (2)   Feeding :   Independent (2)   Molly Doll LPN 11/2/2020 7:56 AM CST   ADL Index Score :   12    Capable of Shopping :   Yes   Capable of Walking :   Yes   Capable of Housekeeping :   Yes   Capable of Managing Medications :   Yes   Capable of Handling Finances :   Yes   Molly Doll LPN 11/2/2020 7:56 AM CST

## 2022-02-15 NOTE — PROGRESS NOTES
Chief Complaint    AWV  History of Present Illness      General health status:  good      Diet:  reduced calories      Exercise:  occasional walking      Medical encounters:  dermatology in March      Dental exam:  10/21      Eye exam:  10/21      Caffeine use:  occasional coffee      Tobacco use:  no      Alcohol use:  occasional      Lipid and diabetes screening:  10/21      Colon cancer screening:  n/a      Mammogram:  n/a      Bone health:  n/a      Pap smear:  n/a      LMP:  menopause      Immunizations:  up to date      Other concerns:  skin lesion left wrist, mild dysuria for the last week      Chronic disease:  HTN, dyslipidemia progressing as expected  Review of Systems          Constitutional:  No fever, No chills, No sweats, No weakness, No fatigue.            Eye:  No recent visual problem.            Ear/Nose/Mouth/Throat:  No decreased hearing, No nasal congestion, No sore throat.            Respiratory:  No shortness of breath, No cough.            Cardiovascular:  Peripheral edema, No chest pain, No palpitations.            Gastrointestinal:  No nausea, No vomiting, No diarrhea, No constipation, No heartburn.            Genitourinary:  No dysuria, No change in urine stream.            Hematology/Lymphatics:  No bruising tendency, No bleeding tendency.            Endocrine:  No cold intolerance, No heat intolerance.            Immunologic:  Negative.            Musculoskeletal:  No back pain, No neck pain, No joint pain, No muscle pain.            Integumentary:  Dryness, Skin lesion, No rash.            Neurologic:  Alert and oriented X4, No headache.          Cognitive impairment:          Year:  ok          Date:  ok          City: ok           Psychiatric:  No anxiety, No depression.                       PHQ9 and CAGE reviewed and discussed with patient.                       Hearing:  passed          Vision:  passed          ADL: no concerns, completely independent          Fall risk: none           Home safety:  no concerns                     Advanced care planning:  completed   Physical Exam   Vitals & Measurements    T: 97.1  F (Tympanic)  HR: 65 (Peripheral)  BP: 178/74     HT: 63.5 in  WT: 198.1 lb  BMI: 34.54           General:  Alert and oriented, No acute distress.            Eye:  Normal conjunctiva, Vision unchanged.            HENT:  Normocephalic, Tympanic membranes are clear, Oral mucosa is moist, No pharyngeal erythema.            Neck:  Supple, Non-tender, No carotid bruit, No lymphadenopathy, No thyromegaly.            Respiratory:  Lungs are clear to auscultation, Respirations are non-labored.            Cardiovascular:  Normal rate, Regular rhythm, Normal peripheral perfusion.            Gastrointestinal:  Soft, Non-tender.            Genitourinary:  No costovertebral angle tenderness.            Musculoskeletal:  Normal range of motion, Normal strength.            Integumentary:  Warm, Dry, No rash.  6 mm ulcerated, pink lesion on the left wrist          Neurologic:  Alert, Oriented.            Psychiatric:  Cooperative, Appropriate mood & affect.    Assessment/Plan       1. Medicare annual wellness visit, subsequent (Z09)         Discussed diet, weight management, BMI, activity and exercise        Follow up in one year        Risk factors for development of chronic disease and infirmities of ageing have been discussed and plans for minimizing and screening for these conditions have been discussed.  Plans in place for management of conditions identified.  The preventive services checklist has been reviewed with the patient and a copy has been placed in the electronic record.  Prostate cancer, colon cancer, diabetes, lipid, HTN, obesity screening discussed and initiated                2. Dyslipidemia (E78.2)         Well controlled, tolerating statin        continue current medication                3. Hypertension (I10)         BP not adequately controlled today with no change on  recheck        Check BP at home over the next few weeks and report the numbers.  If not at goal of <140/90 medication adjustments will be made                4. Obese (E66.09)         discussed calorie reduced diet, regular walking                5. Skin lesion of wrist (L98.9)        suspicious for BCC         lesion was treated with 3 freeze/thaw cycles of liquid nitrogen and recheck in 3 months                6. Dysuria (R30.0)         UA is negative, observe and follow up if symptoms worsen         Ordered:          Urinalysis (Request), Priority: STAT, Dysuria                7. Encounter for immunization (Z23)         Ordered:          influenza virus vaccine, inactivated, 0.7 mL, IM, once, (Completed)          SARS-CoV-2 (COVID-19) mRNA-1273 vaccine, 0.25 mL, IM, once, (Completed)          0013A adm sarscov2 100mcg/0.5ml 3rd (Charge), Quantity: 1, Encounter for immunization          31877 imadm prq id subq/im njxs 1 vaccine (Charge), Quantity: 1, Encounter for immunization          61644 influenza vaccine splt prsrv free inc antigen im (Charge), Quantity: 1, Encounter for immunization          39492 sarscov2 vaccine 100mcg/0.5ml im use (Charge), Quantity: 1, Encounter for immunization                Orders:         chlorthalidone, = 1 tab(s) ( 25 mg ), PO, Daily, # 90 tab(s), 3 Refill(s), Type: Maintenance, Pharmacy: [id number], 1 tab(s) Oral daily, 63.5, in, 11/04/21 13:14:00 CDT, Height Measured, 198.1, lb, 11/04/21 13:14:00 CDT, Weight Measured, (Ordered)         chlorthalidone, = 1 tab(s) ( 25 mg ), PO, Daily, # 90 tab(s), 0 Refill(s), Type: Hard Stop, Pharmacy: [facility], Pt will get further refills at 11/4/2021 appt w/ GTG, 63.5, in, 11/02/20 7:56:00 CST, Height Measured, 206, lb, 11/02/20 7:56:00 CST, Weight Vicki..., (Completed)         lisinopril, = 1 tab(s) ( 20 mg ), po, daily, # 90 tab(s), 3 Refill(s), Type: Maintenance, Pharmacy: [id number], 1 tab(s) Oral daily, 63.5, in,  11/04/21 13:14:00 CDT, Height Measured, 198.1, lb, 11/04/21 13:14:00 CDT, Weight Measured, (Ordered)         lisinopril, = 1 tab(s) ( 20 mg ), po, daily, # 90 tab(s), 0 Refill(s), Type: Hard Stop, Pharmacy: [facility] [id number] [facility], Pt will get further refills at 11/4/2021 appt, 63.5, in, 11/02/20 7:56:00 CST, Height Measured, 206, lb, 11/02/20 7:56:00 CST, Weight Measured, (Completed)         simvastatin, = 1 tab(s), Oral, qhs, # 90 tab(s), 3 Refill(s), Type: Maintenance, Pharmacy: [facility] [id number] [facility], 1 tab(s) Oral qhs,x90 day(s), 63.5, in, 11/04/21 13:14:00 CDT, Height Measured, 198.1, lb, 11/04/21 13:14:00 CDT, Weight Measured, (Ordered)         simvastatin, = 1 tab(s), Oral, qhs, x 90 day(s), # 90 tab(s), 0 Refill(s), Type: Hard Stop, Pharmacy: [facility] [id number] [facility], Pt will get further refills at 11/4/2021 w/ GTG., 63.5, in, 11/02/20 7:56:00 CST, Height Measured, 206, lb, 11/02/20 7:56:00 CST, Weight Kris..., (Completed)         valACYclovir, = 2 tab(s) ( 2 gm ), Oral, bid, # 12 tab(s), 0 Refill(s), Type: Maintenance, Pharmacy: [facility] [id number] [facility], 2 tab(s) Oral bid, 63.5, in, 11/04/21 13:14:00 CDT, Height Measured, 198.1, lb, 11/04/21 13:14:00 CDT, Weight Measured, (Ordered)         Return to Clinic (Request), RFV: AWV, Return in 1 year         Return to Clinic (Request), RFV: Yearly exam/physical, Return in 1 year  Patient Information     Name:MENA POOL      Address:      [address] [place] [address]     Sex:Female     YOB: 1940     [phone]     Emergency Contact:ANTIONE POOL     MRN:[id number]     [id number]     Location:[facility]     Date of Service:11/04/2021      Primary Care Physician:       Jason Barth MD, [phone]      Attending Physician:       Jason Barth MD, [phone]  Problem List/Past Medical History    Ongoing     Basal cell carcinoma of face     Basal cell carcinoma, lip       Comments: upper lip     Diverticulosis of sigmoid  colon     Dyslipidemia     Hypertension     LE Varicosities     Obese     Osteopenia of the elderly     Tobacco user    Historical     Hypertension  Procedure/Surgical History     Extracapsular cataract extraction and insertion of intraocular lens (10/22/2015)      Comments: Left..     DEXA - Dual energy X-ray photon absorptiometry (10/15/2013)      Comments: Osteopenia in hip/spine - recheck in 3-5 yrs.     Colonoscopy (2011)      Comments: Normal colonoscopy, repeat in 5 years family hx colon polyps. Colonoscopy to the proximal ascending colon. Sedation:   MAC.     Removal of Basal cell carcinoma from upper lip ()     Colonoscopy ()      Comments: hyperplastic recheck .      x2     [place] teeth extraction  Medications    aspirin 81 mg oral tablet, 81 mg= 1 tab(s), Oral, daily    chlorthalidone 25 mg oral tablet, 25 mg= 1 tab(s), Oral, daily, 3 refills    lisinopril 20 mg oral tablet, 20 mg= 1 tab(s), Oral, daily, 3 refills    multivitamin with minerals (w/ Iron)    simvastatin 40 mg oral tablet, 1 tab(s), Oral, qhs, 3 refills    valACYclovir 1 g oral tablet, 2 gm= 2 tab(s), Oral, bid  Allergies    Nickel  Social History    Smoking Status     Former smoker     Alcohol      Current, Wine (5 oz), 1-2 times per year     Electronic Cigarette/Vaping      Electronic Cigarette Use: Never.     Employment/School      Retired, Highest education level: High school.     Exercise      Exercise frequency: occasional. Exercise type: Walking.     Home/Environment      Marital status: . Spouse/Partner name: Jossue. Lives with Spouse.     Nutrition/Health      Type of diet: Regular.     Substance Abuse      Never     Tobacco      Former smoker, quit more than 30 days ago  Family History    Alzheimer's disease: Grandmother (M).    Arthritis: Aunt (P).    CA - Cancer of colon: Uncle (P).    CVA - Cerebral infarction: Mother.    Diabetes mellitus type 2: Aunt (P).    Heart disease: Father, Aunt (P),  Grandfather (P) and Grandmother (P).    Hypertension: Mother.    MI: Father.    Multiple myeloma: Uncle (P).    Sister: History is negative    Brother: History is negative  Lab Results       Lab Results (Last 4 results within 90 days)        Sodium Level: 141 mmol/L [135 mmol/L - 146 mmol/L] (10/28/21 08:11:00)       Potassium Level: 4.5 mmol/L [3.5 mmol/L - 5.3 mmol/L] (10/28/21 08:11:00)       Chloride Level: 103 mmol/L [98 mmol/L - 110 mmol/L] (10/28/21 08:11:00)       CO2 Level: 31 mmol/L [20 mmol/L - 32 mmol/L] (10/28/21 08:11:00)       Glucose Level: 106 mg/dL High [65 mg/dL - 99 mg/dL] (10/28/21 08:11:00)       BUN: 23 mg/dL [7 mg/dL - 25 mg/dL] (10/28/21 08:11:00)       Creatinine Level: 1.03 mg/dL High [0.6 mg/dL - 0.93 mg/dL] (10/28/21 08:11:00)       BUN/Creat Ratio: 22 [6  - 22] (10/28/21 08:11:00)       eGFR: 52 mL/min/1.73m2 Low (10/28/21 08:11:00)       eGFR : 60 mL/min/1.73m2 (10/28/21 08:11:00)       Calcium Level: 9.8 mg/dL [8.6 mg/dL - 10.4 mg/dL] (10/28/21 08:11:00)       Hgb A1c: 6.2 High (10/28/21 08:11:00)       Cholesterol: 156 mg/dL (10/28/21 08:11:00)       Non-HDL Cholesterol: 100 (10/28/21 08:11:00)       HDL: 56 mg/dL (10/28/21 08:11:00)       Cholesterol/HDL Ratio: 2.8 (10/28/21 08:11:00)       LDL: 76 (10/28/21 08:11:00)       Triglyceride: 143 mg/dL (10/28/21 08:11:00)  Immunizations       Scheduled Immunizations       Dose Date(s)       influenza virus vaccine, inactivated       09/15/2015, 11/09/2009       SARS-CoV-2 (COVID-19) Moderna-1273       02/17/2021, 03/17/2021       zoster vaccine, inactivated       06/11/2019, 09/03/2019       Other Immunizations               influenza virus vaccine, inactivated       10/12/2010, 11/16/2011, 08/27/2012, 10/02/2013, 10/23/2014, 09/22/2016, 10/24/2017, 10/25/2018, 10/29/2019, 09/21/2020, 11/04/2021       pneumococcal (PPSV23)       08/17/2009       Td       01/01/2005       ZOS, shingles       08/27/2012       pneumococcal  (PCV13)       10/01/2015       SARS-CoV-2 (COVID-19) Moderna-1273       11/04/2021

## 2022-02-15 NOTE — TELEPHONE ENCOUNTER
---------------------  From: Jason Barth MD   To: MENA POOL    Sent: 10/28/2020 8:58:35 PM CDT  Subject: Patient Message - Results Notification       Your tests look good.  We will discuss the results at your upcoming visit.     Results:  Date Result Name Ind Value Ref Range   10/26/2020 8:02 AM Sodium Level  142 mmol/L (135 - 146)   10/26/2020 8:02 AM Potassium Level  4.5 mmol/L (3.5 - 5.3)   10/26/2020 8:02 AM Chloride Level  106 mmol/L (98 - 110)   10/26/2020 8:02 AM CO2 Level  29 mmol/L (20 - 32)   10/26/2020 8:02 AM Glucose Level ((H)) 116 mg/dL (65 - 99)   10/26/2020 8:02 AM BUN  20 mg/dL (7 - 25)   10/26/2020 8:02 AM Creatinine Level ((H)) 1.03 mg/dL (0.60 - 0.93)   10/26/2020 8:02 AM BUN/Creat Ratio  19 (6 - 22)   10/26/2020 8:02 AM eGFR ((L)) 52 mL/min/1.73m2 (> OR = 60 - )   10/26/2020 8:02 AM Calcium Level  9.6 mg/dL (8.6 - 10.4)   10/26/2020 8:02 AM Hgb A1c ((H)) 6.1 ( - <5.7)   10/26/2020 8:02 AM Cholesterol  146 mg/dL ( - <200)   10/26/2020 8:02 AM Non-HDL Cholesterol  90 ( - <130)   10/26/2020 8:02 AM HDL  56 mg/dL (> OR = 50 - )   10/26/2020 8:02 AM Cholesterol/HDL Ratio  2.6 ( - <5.0)   10/26/2020 8:02 AM LDL  70    10/26/2020 8:02 AM Triglyceride  117 mg/dL ( - <150)

## 2022-02-15 NOTE — PROGRESS NOTES
Patient:   MENA POOL            MRN: [id number]            FIN: [id number]               Age:   80 years     Sex:  Female     :  1940   Associated Diagnoses:   Medicare annual wellness visit, subsequent; Dyslipidemia; Hypertension; Obese   Author:   Jason Barth MD      Visit Information      Date of Service: 2020 07:51 am  Performing Location: [facility]  Encounter#: [id number]      Primary Care Provider (PCP):  Jason Barth MD    NPI# [id number]   Visit type:  Medicare, annual wellness visit.    Accompanied by:  No one.    Source of history:  Self.    History limitation:  None.       Chief Complaint   2020 7:56 AM CST    AWV-medicare, Medication refills.      Well Adult History   Well Adult History             The patient presents for Medicare well exam.  The patient's general health status is described as good.  The patient's diet is described as balanced.  Exercise:.  Associated symptoms consist of.  Last menstrual period: menopausal.  Medical encounters: dermatology.  Complaint:.  Additional pertinent history: daily caffeine use, tobacco use none and occasional alcohol use.       mammogram:  n/a  Pap smear:  n/a  colonoscopy:  n/a  lipid and diabetes screening:  up to date   immunizations:  up to date   other:  doing well with chronic disease, HTN , hyperlipidemia and prediabetes, no medication side effects      Review of Systems   Constitutional:  No fever, No chills, No sweats, No weakness, No fatigue.    Eye:  No recent visual problem.    Ear/Nose/Mouth/Throat:  No decreased hearing, No nasal congestion, No sore throat.    Respiratory:  No shortness of breath, No cough.    Cardiovascular:  Negative, No chest pain, No palpitations, No peripheral edema.    Gastrointestinal:  No nausea, No vomiting, No diarrhea, No constipation, No heartburn.    Genitourinary:  No dysuria, No change in urine stream.    Hematology/Lymphatics:  No bruising tendency, No bleeding  tendency.    Endocrine:  No cold intolerance, No heat intolerance.    Immunologic:  Negative.    Musculoskeletal:  No back pain, No neck pain, No joint pain, No muscle pain.    Integumentary:  Rash, No dryness, No skin lesion.    Neurologic:  Alert and oriented X4,   Cognitive impairment:  mild   Year: ok  Date: ok  City: ok.    Psychiatric:  No anxiety, No depression.    GDS and CAGE reviewed and discussed with patient.      Hearing impairment:  mild  ADL: independent  Fall risk:  moderate  Home safety:  no concerns    Advanced care planning:  completed      Health Status   Allergies:    Allergic Reactions (Selected)  Severity Not Documented  Nickel (No reactions were documented)   Medications:  (Selected)   Prescriptions  Prescribed  chlorthalidone 25 mg oral tablet: = 1 tab(s) ( 25 mg ), PO, Daily, # 90 tab(s), 3 Refill(s), Type: Maintenance, Pharmacy: [facility], 1 tab(s) Oral daily, 63.5, in, 11/02/20 7:56:00 CST, Height Measured, 206, lb, 11/02/20 7:56:00 CST, Weight Measured  lisinopril 20 mg oral tablet: = 1 tab(s) ( 20 mg ), po, daily, # 90 tab(s), 3 Refill(s), Type: Maintenance, Pharmacy: [facility], 1 tab(s) Oral daily, 63.5, in, 11/02/20 7:56:00 CST, Height Measured, 206, lb, 11/02/20 7:56:00 CST, Weight Measured  simvastatin 40 mg oral tablet: = 1 tab(s), Oral, qhs, # 90 tab(s), 3 Refill(s), Type: Maintenance, Pharmacy: [facility], 1 tab(s) Oral qhs,x90 day(s), 63.5, in, 11/02/20 7:56:00 CST, Height Measured, 206, lb, 11/02/20 7:56:00 CST, Weight Measured  Documented Medications  Documented  aspirin 81 mg oral tablet: 1 tab(s) ( 81 mg ), po, daily, 0 Refill(s)  multivitamin with minerals (w/ Iron): 0 Refill(s), Type: Maintenance   Problem list:    All Problems  Hypertension / SNOMED CT 0448674654 / Confirmed  Tobacco user / SNOMED CT 196902732 / Probable  BCC (basal cell carcinoma), face / SNOMED CT 5275185613 / Confirmed  Obese  / SNOMED CT 5484148789 / Probable  Diverticulosis of sigmoid colon / SNOMED CT 6304096897 / Confirmed  Dyslipidemia / SNOMED CT 702404144 / Confirmed  Osteopenia of the elderly / SNOMED CT 82307002 / Confirmed  Inactive: LE Varicosities  Inactive: Basal Cell Carcinoma, Lip / ICD-9-.0  Resolved: Hypertension / SNOMED CT 26691224  Canceled: BCC (basal cell carcinoma), face / ICD-9-.31  Canceled: High Cholesterol / ICD-9-.0  Canceled: Family history of colonic polyps / SNOMED CT 6570020625  Canceled: Elevated LDL cholesterol level / SNOMED CT 1480512933  Canceled: Hyperlipidemia / SNOMED CT 480886161     Other Healthcare:         Histories   Past Medical History:    Active  BCC (basal cell carcinoma), face (8981323223): Onset on 2013 at 73 years.  Diverticulosis of sigmoid colon (2224674948): Onset on 2011 at 71 years.  Dyslipidemia (536744486)  Obese (8741841874)  Osteopenia of the elderly (25182144)  Resolved  Hypertension (52604260):  Resolved.   Family History:    MI  Father ()  CVA - Cerebral infarction  Mother ()  Hypertension  Mother ()  Heart disease  Father ()  Comments:  10/26/2018 8:52 AM CDT - Caroline Willss  CA - Cancer of colon  Uncle (P)     Procedure history:    Extracapsular cataract extraction and insertion of intraocular lens (SNOMED CT 060121372) on 10/22/2015 at 75 Years.  Comments:  11/3/2015 9:11 AM CST - Jonelle Bowman  Left.  DEXA - Dual energy X-ray photon absorptiometry (SNOMED CT 343054831) on 10/15/2013 at 73 Years.  Comments:  10/25/2013 9:05 AM CDT - Viktoriya Rojas CMA  Osteopenia in hip/spine - recheck in 3-5 yrs  Colonoscopy (SNOMED CT 112585730) on 2011 at 71 Years.  Comments:  10/17/2016 7:11 PM CDT - Tiffany CARVAJAL, Shweta  Sedation:   MAC    2011 8:34 AM CDT - Jason Portillo MD  Colonoscopy to the proximal ascending colon    2011 8:13 AM CDT - Alexandria Engle MA  Normal colonoscopy, repeat in 5 years  family hx colon polyps  Removal of Basal cell carcinoma from upper lip in  at 68 Years.  Colonoscopy (SNOMED CT 755945088) in  at 65 Years.  Comments:  2010 1:12 PM CDT - Jaqueline Barrett  hyperplastic recheck 2010  [place] teeth extraction.   x2.   Social History:        Alcohol Assessment            Past      Tobacco Assessment            Past      Substance Abuse Assessment            Never      Employment and Education Assessment            Retired, Highest education level: High school.      Home and Environment Assessment            Marital status: .  Spouse/Partner name: Jossue.  Lives with Spouse.      Nutrition and Health Assessment            Type of diet: Regular.      Exercise and Physical Activity Assessment            Exercise frequency: occasional.  Exercise type: Walking.        Physical Examination   Vital Signs   2020 7:56 AM CST Temperature Tympanic 97.6 DegF  LOW    Peripheral Pulse Rate 76 bpm    Pulse Site Radial artery    HR Method Manual    Systolic Blood Pressure 146 mmHg  HI    Diastolic Blood Pressure 70 mmHg    Mean Arterial Pressure 95 mmHg    BP Site Right arm    BP Method Electronic      Measurements from flowsheet : Measurements   2020 7:56 AM CST Height Measured - Standard 63.5 in    Weight Measured - Standard 206 lb    BSA 2.04 m2    Body Mass Index 35.91 kg/m2  HI      General:  Alert and oriented, No acute distress.    Eye:  Pupils are equal, round and reactive to light, Extraocular movements are intact, Normal conjunctiva.    HENT:  Normocephalic, Tympanic membranes are clear, Oral mucosa is moist, No pharyngeal erythema, No sinus tenderness.    Neck:  Supple, Non-tender, No carotid bruit, No lymphadenopathy, No thyromegaly.    Respiratory:  Lungs are clear to auscultation, Respirations are non-labored, Breath sounds are equal, No chest wall tenderness.    Cardiovascular:  Normal rate, Regular rhythm, No murmur, No gallop, Normal peripheral  perfusion, No edema.    Gastrointestinal:  Soft, Non-tender, No organomegaly.    Musculoskeletal:  Normal range of motion, Normal strength, No swelling, No deformity.    Integumentary:  Warm, Dry, No rash.    Neurologic:  Alert, Oriented, No focal deficits.    Psychiatric:  Cooperative, Appropriate mood & affect.       Review / Management   Results review:  Lab results   10/26/2020 8:02 AM CDT Sodium Level 142 mmol/L    Potassium Level 4.5 mmol/L    Chloride Level 106 mmol/L    CO2 Level 29 mmol/L    Glucose Level 116 mg/dL  HI    BUN 20 mg/dL    Creatinine Level 1.03 mg/dL  HI    BUN/Creat Ratio 19    eGFR 52 mL/min/1.73m2  LOW    eGFR African American 60 mL/min/1.73m2    Calcium Level 9.6 mg/dL    Hgb A1c 6.1  HI    Cholesterol 146 mg/dL    Non-HDL Cholesterol 90    HDL 56 mg/dL    Cholesterol/HDL Ratio 2.6    LDL 70    Triglyceride 117 mg/dL   .       Impression and Plan   Diagnosis     Medicare annual wellness visit, subsequent (OOQ78-FI Z09).     Course:  Progressing as expected.    Plan:  Counseled on health maintenance, diet, activity, BMI discussed.    Patient Instructions:       Counseled: Regarding diagnosis, Regarding medications, Smoking cessation, Verbalized understanding, Breast cancer, colon cancer, diabetes, lipid, HTN, obesity screening discussed and initiated, Risk factors for development of chronic disease and infirmities of ageing have been discussed and plans for minimizing and screening for these conditions have been discussed.  Plans in place for management of conditions identified.  The preventative screening checklist has been completed and reviewed with the patient and a copy has been filled in the electronic record..    Diagnosis     Dyslipidemia (IVO34-ED E78.2).     Hypertension (KKN79-YN I10).     Obese (VLU43-KU E66.09).     Course:  Progressing as expected, Well controlled.    Plan:  reviewed labs, medication refilled.    Orders     Orders (Selected)    Prescriptions  Prescribed  chlorthalidone 25 mg oral tablet: = 1 tab(s) ( 25 mg ), PO, Daily, # 90 tab(s), 3 Refill(s), Type: Maintenance, Pharmacy: [facility], 1 tab(s) Oral daily, 63.5, in, 11/02/20 7:56:00 CST, Height Measured, 206, lb, 11/02/20 7:56:00 CST, Weight Measured  lisinopril 20 mg oral tablet: = 1 tab(s) ( 20 mg ), po, daily, # 90 tab(s), 3 Refill(s), Type: Maintenance, Pharmacy: [facility], 1 tab(s) Oral daily, 63.5, in, 11/02/20 7:56:00 CST, Height Measured, 206, lb, 11/02/20 7:56:00 CST, Weight Measured  simvastatin 40 mg oral tablet: = 1 tab(s), Oral, qhs, # 90 tab(s), 3 Refill(s), Type: Maintenance, Pharmacy: [facility], 1 tab(s) Oral qhs,x90 day(s), 63.5, in, 11/02/20 7:56:00 CST, Height Measured, 206, lb, 11/02/20 7:56:00 CST, Weight Measured.

## 2022-02-15 NOTE — PROGRESS NOTES
Patient:   MENA POOL            MRN: [id number]            FIN: [id number]               Age:   79 years     Sex:  Female     :  1940   Associated Diagnoses:   Medicare annual wellness visit, initial; Dyslipidemia; Encounter for immunization; Actinic keratitis; Hypertension; Prediabetes; AK (actinic keratosis)   Author:   Jason Barth MD      Visit Information      Date of Service: 10/29/2019 12:42 pm  Performing Location: [facility]  Encounter#: [id number]      Primary Care Provider (PCP):  Jason Barth MD    NPI# [id number]      Care Providers  (10/29/2019 12:50 pm)  Denilson Vilchis DDS, [facility], Phone:  [phone]  Sandip Hilliard OD, [facility], [facility], Phone:  [phone]  Kumar Ibanez MD, [facility], Phone:  [phone]  TOMMY Fortune, [facility], Phone:  [phone]  Ancillary Services  (10/29/2019 12:50 pm)  [facility], Pharmacy        Current Visit Date:  10/29/2019  Last AWV Date:  10/25/2018  Initial AWV Date:  2011          Chief Complaint   10/29/2019 12:50 PM CDT  AWV      History of Present Illness             The patient presents for Medicare annual wellness exam.  The patient's general health status is described as unchanged and stable.  The patient's diet is described as balanced.  Exercise occasional.  Associated symptoms consist of denies shortness of breath and denies weight loss.     Patient is needing her medications refilled, she did have her labs done 10/22/19.  She is prediabetic with hgb a1c at 6.2.  She does try to watch her diet and tries to go on walks when it's nice outside.  She still see's her dermatologist, Chasity Doss PA-C.  She is also needing her flu shot today.      Review of Systems   Ear/Nose/Mouth/Throat:  Hearing is evaluated.    See completed Health History for Review of Systems   Psychiatric:  GDS noted.       Health Status   Allergies:    Allergic Reactions  (Selected)  Severity Not Documented  Nickel (No reactions were documented)   Medications:  (Selected)   Prescriptions  Prescribed  chlorthalidone 25 mg oral tablet: = 1 tab(s) ( 25 mg ), PO, Daily, # 30 tab(s), 0 Refill(s), Type: Maintenance, Pharmacy: [facility], Pt will out of meds prior to 10/29/19 appt w/ GTG, 1 tab(s) Oral daily  lisinopril 20 mg oral tablet: = 1 tab(s) ( 20 mg ), po, daily, # 90 tab(s), 3 Refill(s), Type: Maintenance, Pharmacy: [facility], 1 tab(s) Oral daily  simvastatin 40 mg oral tablet: = 1 tab(s) ( 40 mg ), po, hs, # 90 tab(s), 3 Refill(s), Type: Maintenance, Pharmacy: [facility], 1 tab(s) Oral hs  Documented Medications  Documented  aspirin 81 mg oral tablet: 1 tab(s) ( 81 mg ), po, daily, 0 Refill(s)  multivitamin with minerals (w/ Iron): 0 Refill(s), Type: Maintenance   Problem list:    All Problems  Tobacco user / SNOMED CT 787742559 / Probable  Osteopenia of the elderly / SNOMED CT 39261821 / Confirmed  Obese / SNOMED CT 0582482966 / Probable  Dyslipidemia / SNOMED CT 889836611 / Confirmed  Diverticulosis of sigmoid colon / SNOMED CT 6893449221 / Confirmed  BCC (basal cell carcinoma), face / SNOMED CT 4169869076 / Confirmed   Medicare Assessments      Orr Fall Risk  (10/29/2019 12:50 pm)       History of Fall in Last 3 Months Orr:  No     Functional Assessment  (10/29/2019 12:50 pm)       Bathing ADL Index:  Independent (2)       Dressing ADL Index:  Independent (2)       Toileting ADL Index:  Independent (2)       Transferring Bed or Chair ADL Index:  Independent (2)       Feeding ADL Index:  Independent (2)     Depression Screening  Not recorded for selected visit.    Detailed Depression Screening  Not recorded for selected visit.    Geriatric Depression Screen  (10/29/2019 12:50 pm)       Geriatric Depression Satisfied Life:  Yes       Geriatric Depression Dropped Activities:  No       Geriatric  Depression Life Empty:  No       Geriatric Depression Bored:  No       Geriatric Depression Good Spirits:  Yes       Geriatric Depression Afraid Bad Things:  No       Geriatric Depression Feel Happy:  Yes       Geriatric Depression Feel Helpless:  No       Geriatric Depression Prefer to Stay Home:  No       Geriatric Depression Memory Problems:  No       Geriatric Depression Wonderful Be Alive:  Yes       Geriatric Depression Feel Worthless:  No       Geriatric Depression Situation Hopeless:  No       Geriatric Depression Others Better Off:  No       Geriatric Depression Full of Energy:  Yes       Geriatric Depression Total Score:  0     Hearing Screen  (10/29/2019 12:50 pm)       Ear, Right Audiogram:  Pass       Ear, Left Audiogram:  Pass     Home Safety Screen  (10/29/2019 12:50 pm)       Emergency Numbers Kept/Updated:  Yes       Aware of Smoking Dangers:  Yes       Smoke Alarms/Fire Extinguisher Available:  Yes       Household Members Fire Safety Knowledge:  Yes       Floor Rugs Removed or Fastened:  Yes       Mats in Bathtub/Shower:  Yes       Stairway Rails or Banisters:  Yes       Outdoor Clutter Safety:  Yes       Indoor Clutter Safety:  Yes       Electrical Cord Safety:  Yes     Vision Screen  Not recorded for selected visit.     ADL's and Safety reviewed with patient.      Histories   Past Medical History:    Active  BCC (basal cell carcinoma), face (4251928430): Onset on 2013 at 73 years.  Diverticulosis of sigmoid colon (4695785409): Onset on 2011 at 71 years.  Dyslipidemia (251338739)  Obese (6452095409)  Osteopenia of the elderly (40470757)  Resolved  Hypertension (94142330):  Resolved.   Family History:    MI  Father ()  CVA - Cerebral infarction  Mother ()  Hypertension  Mother ()  Heart disease  Father ()  Comments:  10/26/2018 8:52 AM ANTONIETTA - Caroline Wills  60s  CA - Cancer of colon  Uncle (P)     Procedure history:    Extracapsular cataract extraction and  insertion of intraocular lens (605097449) on 10/22/2015 at 75 Years.  Comments:  11/3/2015 9:11 AM CST - ColbyJonelle santiago  Left.  DEXA - Dual energy X-ray photon absorptiometry (306456338) on 10/15/2013 at 73 Years.  Comments:  10/25/2013 9:05 AM CDT - Bob DAVIS, Viktoriya  Osteopenia in hip/spine - recheck in 3-5 yrs  Colonoscopy (599209263) on 2011 at 71 Years.  Comments:  10/17/2016 7:11 PM CDT - Tiffany CARVAJAL, Shweta  Sedation:   MAC    2011 8:34 AM CDT - Jason Portillo MD  Colonoscopy to the proximal ascending colon    2011 8:13 AM CDT - Alexandria Engle MA  Normal colonoscopy, repeat in 5 years family hx colon polyps  Removal of Basal cell carcinoma from upper lip in  at 68 Years.  Colonoscopy (440183126) in  at 65 Years.  Comments:  2010 1:12 PM CDT - Jaqueline Barrett  hyperplastic recheck 2010  [place] teeth extraction.   x2.   Social History:        Alcohol Assessment            Past      Tobacco Assessment            Past      Substance Abuse Assessment            Never      Employment and Education Assessment            Retired, Highest education level: High school.      Home and Environment Assessment            Marital status: .  Spouse/Partner name: Jossue.  Lives with Spouse.      Nutrition and Health Assessment            Type of diet: Regular.      Exercise and Physical Activity Assessment            Exercise frequency: occasional.  Exercise type: Walking.        Physical Examination   Vital Signs   10/29/2019 12:50 PM CDT Temperature Tympanic 98.3 DegF    Peripheral Pulse Rate 68 bpm    Pulse Site Radial artery    HR Method Manual    Systolic Blood Pressure 152 mmHg  HI    Diastolic Blood Pressure 82 mmHg  HI    Mean Arterial Pressure 105 mmHg    BP Site Right arm    BP Method Manual      Measurements from flowsheet : Measurements   10/29/2019 12:50 PM CDT Height Measured - Standard 63.5 in    Weight Measured - Standard 209 lb    BSA 2.06 m2    Body Mass Index  36.44 kg/m2  HI      General:  Alert and oriented, No acute distress.    Eye:  Pupils are equal, round and reactive to light, Normal conjunctiva.    HENT:  Tympanic membranes are clear, Oral mucosa is moist.    Neck:  Supple, Non-tender, No carotid bruit, No lymphadenopathy.    Respiratory:  Respirations are non-labored.    Cardiovascular:  Normal rate, Regular rhythm, No edema.    Gastrointestinal:  Soft, Non-tender, Non-distended.    Musculoskeletal:  Normal range of motion, Normal gait.    Integumentary:  Warm, No rash, red, crusty skin lesion to right temple, resembling AK.  size=2mm..    Neurologic:  Alert, Oriented, No focal deficits.    Cognition and Speech:  Oriented, Speech clear and coherent, Functional cognition intact.    Psychiatric:  Cooperative, Appropriate mood & affect, Normal judgment, Patient's GDS and CAGE questionnaire reviewed and discussed with patient. .       Review / Management   Results review:  Lab results   10/22/2019 8:15 AM CDT Sodium Level 140 mmol/L    Potassium Level 3.9 mmol/L    Chloride Level 104 mmol/L    CO2 Level 26 mmol/L    Glucose Level 124 mg/dL  HI    BUN 19 mg/dL    Creatinine 1.05 mg/dL  HI    BUN/Creat Ratio 18    eGFR 51 mL/min/1.73m2  LOW    eGFR African American 59 mL/min/1.73m2  LOW    Calcium Level 9.8 mg/dL    Hgb A1c 6.2  HI    Cholesterol 151 mg/dL    Non-    HDL 50 mg/dL  LOW    Chol/HDL Ratio 3.0    LDL 76    Triglyceride 152 mg/dL  HI   .       Impression and Plan   Course:  Progressing as expected.    Plan:  Discussed  preventative services.  Appropriate weight and diet discussed.  Discussed Advance Life Directives.    Preventative services needed::  Preventative services checklist reviewed, updated and copy given to patient.  Please see scanned document.         HIV risk screening: No.    Patient Instructions:          Limitations: Limit caffeine intake, Limit alcohol consumption.         Counseled: Patient, Regarding treatment, Regarding  medications, Diet, Activity, Verbalized understanding.    Diagnosis     Medicare annual wellness visit, initial (HBG88-GE Z00.00).     Dyslipidemia (OSJ01-IY E78.2).     Encounter for immunization (XXQ16-RO Z23).     Actinic keratitis (UMI59-GY H16.139).     Hypertension (XKF47-RK I10).     Prediabetes (BUD18-JD R73.03).     Course:  Progressing as expected, Home blood pressures have been in 130/80 range.    Orders     Orders (Selected)   Outpatient Orders  Ordered  Return to Clinic (Request): RFV: AWV and fasting labs--lipid panel, BMP, Hgb a1c--per GTG., Return in 1 year  Completed  79218 imadm prq id subq/im njxs 1 vaccine (Charge): Quantity: 1, Encounter for immunization  34605 Influenza virus vaccine, quadrivalent, riv4 (Charge): Quantity: 1, Encounter for immunization  Flublok Quadrivalent [id number]: 0.5 mL, IM, once  Prescriptions  Prescribed  chlorthalidone 25 mg oral tablet: = 1 tab(s) ( 25 mg ), PO, Daily, # 90 tab(s), 3 Refill(s), Type: Maintenance, Pharmacy: [facility], 1 tab(s) Oral daily  lisinopril 20 mg oral tablet: = 1 tab(s) ( 20 mg ), po, daily, # 90 tab(s), 3 Refill(s), Type: Maintenance, Pharmacy: [facility], 1 tab(s) Oral daily  simvastatin 40 mg oral tablet: = 1 tab(s) ( 40 mg ), po, hs, # 90 tab(s), 3 Refill(s), Type: Maintenance, Pharmacy: [facility], 1 tab(s) Oral hs.       Plan:  Reviewed lab results from 10/22/19, discussed improving diet, increasing activity level, try to lose weight.  Discussed adding Metformin to help prevent hgb a1c from increasing, will wait until next year to see what her hgb a1c is at that time.  Otherwise, she will remain on her current medications at current doses.  Medication refills have been sent in.  Flu shot today.  RTC 1 year for AWV..    Diagnosis     AK (actinic keratosis) (OYH99-UJ L57.0).     Course:  Progressing as expected.    Plan:  lesion was treated with 3 freeze/thaw cycles  of liquid nitrogen.      I, Shweta Allen MA, acted solely as a scribe for, and in the presence of Dr. Jason Barth who performed the services.    I, Jason Barth MD, personally performed the services described in this documentation.  The documentation was scribed in my presence and is both accurate and complete.      Total time spent with patient and coordination of care:  _  minutes

## 2022-03-02 NOTE — TELEPHONE ENCOUNTER
---------------------  From: Gelacio Cerrato LPN (Phone Messages Pool ([id number]_[facility]))   To: [facility] Message Pool ([id number]_[place] [facility] [place]);     Sent: 1/4/2022 10:45:45 AM CST  Subject: General Message     Phone message    PCP:   Roselyn CARMEN      Time of Call:  _ 1030       Person Calling:  _ Self  Phone number:  _    Returned call at: _    Note:   _ Pt called saying she changed pharmacies from [facility] back to [facility].  Says she was told by both pharmacies that due to poor staffing levels, they would be unable to transfer the remaining qty's of her meds over to [facility] and was told to contact us for a new order.  Said she had not yet picked up any of the new fills from 11-4-21, so I sent 1 yr supply to 's in [place].  Told pt to contact [facility] in [place] to cancel the current order and she said she would.    Last office visit and reason:  _---------------------  From: Shweta Allen MA ([facility] Message Pool ([id number]_[facility]))   To: Jason Barth MD;     Sent: 1/4/2022 11:17:51 AM CST  Subject: FW: General Messagenoted

## 2022-12-23 ENCOUNTER — DOCUMENTATION ONLY (OUTPATIENT)
Dept: LAB | Facility: CLINIC | Age: 82
End: 2022-12-23

## 2022-12-23 DIAGNOSIS — I10 ESSENTIAL HYPERTENSION: ICD-10-CM

## 2022-12-23 DIAGNOSIS — E78.2 MIXED HYPERLIPIDEMIA: Primary | ICD-10-CM

## 2022-12-26 DIAGNOSIS — E78.2 MIXED HYPERLIPIDEMIA: ICD-10-CM

## 2022-12-26 DIAGNOSIS — I10 PRIMARY HYPERTENSION: Primary | ICD-10-CM

## 2022-12-26 RX ORDER — SIMVASTATIN 40 MG
40 TABLET ORAL AT BEDTIME
Qty: 90 TABLET | Refills: 0 | Status: SHIPPED | OUTPATIENT
Start: 2022-12-26 | End: 2023-03-27

## 2022-12-26 RX ORDER — CHLORTHALIDONE 25 MG/1
25 TABLET ORAL DAILY
Qty: 90 TABLET | Refills: 0 | Status: SHIPPED | OUTPATIENT
Start: 2022-12-26 | End: 2023-03-27

## 2022-12-26 RX ORDER — LISINOPRIL 20 MG/1
20 TABLET ORAL DAILY
Qty: 90 TABLET | Refills: 0 | Status: SHIPPED | OUTPATIENT
Start: 2022-12-26 | End: 2023-03-27

## 2022-12-26 NOTE — TELEPHONE ENCOUNTER
Routing refill request to provider for review/approval because:  LOV 11/04/2021    Protocol Failed     Blood pressure under 140/90 in past 12 months    Medication is active on med list    Normal serum creatinine on file in past 12 months    Normal serum potassium on file in past 12 months        BRUNO Zheng  [facility]

## 2023-03-23 DIAGNOSIS — E78.2 MIXED HYPERLIPIDEMIA: ICD-10-CM

## 2023-03-23 DIAGNOSIS — I10 PRIMARY HYPERTENSION: ICD-10-CM

## 2023-03-24 NOTE — TELEPHONE ENCOUNTER
"    Last office visit: 11/04/2021    Future Appointments 3/24/2023 - 9/20/2023    None          Requested Prescriptions   Pending Prescriptions Disp Refills     lisinopril (ZESTRIL) 20 MG tablet [Pharmacy Med Name: LISINOPRIL 20MG TABLETS] 90 tablet 0     Sig: TAKE 1 TABLET(20 MG) BY MOUTH DAILY       ACE Inhibitors (Including Combos) Protocol Failed - 3/23/2023  5:13 AM        Failed - Blood pressure under 140/90 in past 12 months     BP Readings from Last 3 Encounters:   11/04/21 (!) 178/74 11/02/20 (!) 152/68   10/29/19 132/81                 Failed - Recent (12 mo) or future (30 days) visit within the authorizing provider's specialty     Patient has had an office visit with the authorizing provider or a provider within the authorizing providers department within the previous 12 mos or has a future within next 30 days. See \"Patient Info\" tab in inbasket, or \"Choose Columns\" in Meds & Orders section of the refill encounter.              Failed - Normal serum creatinine on file in past 12 months     Recent Labs   Lab Test 10/28/21  0811   CR 1.03*       Ok to refill medication if creatinine is low          Failed - Normal serum potassium on file in past 12 months     Recent Labs   Lab Test 10/28/21  0811   POTASSIUM 4.5             Passed - Medication is active on med list        Passed - Patient is age 18 or older        Passed - No active pregnancy on record        Passed - No positive pregnancy test within past 12 months           chlorthalidone (HYGROTON) 25 MG tablet [Pharmacy Med Name: CHLORTHALIDONE 25MG TABLETS] 90 tablet 0     Sig: TAKE 1 TABLET(25 MG) BY MOUTH DAILY       Diuretics (Including Combos) Protocol Failed - 3/23/2023  5:13 AM        Failed - Blood pressure under 140/90 in past 12 months     BP Readings from Last 3 Encounters:   11/04/21 (!) 178/74   11/02/20 (!) 152/68   10/29/19 132/81                 Failed - Recent (12 mo) or future (30 days) visit within the authorizing provider's " "specialty     Patient has had an office visit with the authorizing provider or a provider within the authorizing providers department within the previous 12 mos or has a future within next 30 days. See \"Patient Info\" tab in inbasket, or \"Choose Columns\" in Meds & Orders section of the refill encounter.              Failed - Normal serum creatinine on file in past 12 months     Recent Labs   Lab Test 10/28/21  0811   CR 1.03*              Failed - Normal serum potassium on file in past 12 months     Recent Labs   Lab Test 10/28/21  0811   POTASSIUM 4.5                    Failed - Normal serum sodium on file in past 12 months     Recent Labs   Lab Test 10/28/21  0811                 Passed - Medication is active on med list        Passed - Patient is age 18 or older        Passed - No active pregancy on record        Passed - No positive pregnancy test in past 12 months           simvastatin (ZOCOR) 40 MG tablet [Pharmacy Med Name: SIMVASTATIN 40MG TABLETS] 90 tablet 0     Sig: TAKE 1 TABLET(40 MG) BY MOUTH AT BEDTIME       Statins Protocol Failed - 3/23/2023  5:13 AM        Failed - LDL on file in past 12 months     Recent Labs   Lab Test 10/28/21  0811   LDL 76             Failed - Recent (12 mo) or future (30 days) visit within the authorizing provider's specialty     Patient has had an office visit with the authorizing provider or a provider within the authorizing providers department within the previous 12 mos or has a future within next 30 days. See \"Patient Info\" tab in [employer], or \"Choose Columns\" in Meds & Orders section of the refill encounter.              Passed - No abnormal creatine kinase in past 12 months     No lab results found.             Passed - Medication is active on med list        Passed - Patient is age 18 or older        Passed - No active pregnancy on record        Passed - No positive pregnancy test in past 12 months                   "

## 2023-03-27 RX ORDER — LISINOPRIL 20 MG/1
TABLET ORAL
Qty: 90 TABLET | Refills: 0 | Status: SHIPPED | OUTPATIENT
Start: 2023-03-27 | End: 2023-05-08

## 2023-03-27 RX ORDER — SIMVASTATIN 40 MG
TABLET ORAL
Qty: 90 TABLET | Refills: 0 | Status: SHIPPED | OUTPATIENT
Start: 2023-03-27 | End: 2023-06-13

## 2023-03-27 RX ORDER — CHLORTHALIDONE 25 MG/1
TABLET ORAL
Qty: 90 TABLET | Refills: 0 | Status: SHIPPED | OUTPATIENT
Start: 2023-03-27 | End: 2023-06-13

## 2023-05-08 ENCOUNTER — OFFICE VISIT (OUTPATIENT)
Dept: FAMILY MEDICINE | Facility: CLINIC | Age: 83
End: 2023-05-08
Payer: COMMERCIAL

## 2023-05-08 VITALS
HEIGHT: 64 IN | DIASTOLIC BLOOD PRESSURE: 68 MMHG | HEART RATE: 92 BPM | OXYGEN SATURATION: 98 % | BODY MASS INDEX: 34.88 KG/M2 | WEIGHT: 204.3 LBS | TEMPERATURE: 97.9 F | SYSTOLIC BLOOD PRESSURE: 162 MMHG | RESPIRATION RATE: 16 BRPM

## 2023-05-08 DIAGNOSIS — E66.01 MORBID OBESITY (H): ICD-10-CM

## 2023-05-08 DIAGNOSIS — H81.10 BENIGN PAROXYSMAL POSITIONAL VERTIGO, UNSPECIFIED LATERALITY: Primary | ICD-10-CM

## 2023-05-08 DIAGNOSIS — I10 PRIMARY HYPERTENSION: ICD-10-CM

## 2023-05-08 PROBLEM — E66.9 OBESITY: Status: ACTIVE | Noted: 2023-05-08

## 2023-05-08 PROBLEM — E78.2 MIXED HYPERLIPIDEMIA: Status: ACTIVE | Noted: 2023-05-08

## 2023-05-08 PROBLEM — M85.80 SENILE OSTEOPENIA: Status: ACTIVE | Noted: 2023-05-08

## 2023-05-08 PROCEDURE — 99214 OFFICE O/P EST MOD 30 MIN: CPT | Performed by: FAMILY MEDICINE

## 2023-05-08 RX ORDER — MECLIZINE HYDROCHLORIDE 25 MG/1
25 TABLET ORAL PRN
COMMUNITY
Start: 2023-05-03 | End: 2023-05-26

## 2023-05-08 RX ORDER — LISINOPRIL 30 MG/1
30 TABLET ORAL DAILY
Qty: 90 TABLET | Refills: 0 | Status: SHIPPED | OUTPATIENT
Start: 2023-05-08 | End: 2023-08-02

## 2023-05-08 ASSESSMENT — ENCOUNTER SYMPTOMS
RESPIRATORY NEGATIVE: 1
EYES NEGATIVE: 1
PSYCHIATRIC NEGATIVE: 1
CARDIOVASCULAR NEGATIVE: 1
ENDOCRINE NEGATIVE: 1
GASTROINTESTINAL NEGATIVE: 1
HEMATOLOGIC/LYMPHATIC NEGATIVE: 1
MUSCULOSKELETAL NEGATIVE: 1
DIZZINESS: 1
CONSTITUTIONAL NEGATIVE: 1

## 2023-05-08 ASSESSMENT — PATIENT HEALTH QUESTIONNAIRE - PHQ9
10. IF YOU CHECKED OFF ANY PROBLEMS, HOW DIFFICULT HAVE THESE PROBLEMS MADE IT FOR YOU TO DO YOUR WORK, TAKE CARE OF THINGS AT HOME, OR GET ALONG WITH OTHER PEOPLE: NOT DIFFICULT AT ALL
SUM OF ALL RESPONSES TO PHQ QUESTIONS 1-9: 5
SUM OF ALL RESPONSES TO PHQ QUESTIONS 1-9: 5

## 2023-05-08 NOTE — PATIENT INSTRUCTIONS
You have been referred to Physical Therapy. You can contact the physical therapy office directly to schedule your appointment at the number below.    [facility] - [phone]    If you have any questions or you need further assistance, please contact the clinic at [phone].

## 2023-05-08 NOTE — PROGRESS NOTES
"  Assessment & Plan     Benign paroxysmal positional vertigo, unspecified laterality  Patient with persistent positional vertigo.  Reviewed ER notes.  Meclizine has been helping but she prefer not to stay on medication.  Discussed other options and will do physical therapy referral.  Given phone number to call to schedule that appointment.  - Physical Therapy Referral; Future    Primary hypertension  Patient with uncontrolled hypertension.  Has not been tracking blood pressure regularly.  Has not been following up.  Has been on same course of lisinopril 20 mg and chlorthalidone.  Reviewed lab work from ER and creatinine was normal.  Patient is significantly overdue for follow-up with her PCP.  Does not want to schedule that appointment while she is here but agrees to come back.  We will try increasing her dose of lisinopril to 30 mg daily.  I sent in a 90-day supply but have put in an order for her to come back again in 4 weeks.  At that time may need to recheck lab work given dose changes.  - lisinopril (ZESTRIL) 30 MG tablet; Take 1 tablet (30 mg) by mouth daily    Morbid obesity (H)  Patient with BMI of 35 that is likely contributing to her hypertension.  She does try to follow a healthy low-salt diet and get regular exercise which gets easier as the weather gets better.  Currently disrupted by vertigo.  We will continue to monitor and follow-up with her PCP next month as instructed           MED REC REQUIRED  Post Medication Reconciliation Status: discharge medications reconciled and changed, per note/orders  BMI:   Estimated body mass index is 35.62 kg/m  as calculated from the following:    Height as of this encounter: 1.613 m (5' 3.5\").    Weight as of this encounter: 92.7 kg (204 lb 4.8 oz).   Weight management plan: Discussed healthy diet and exercise guidelines        Rochelle Macario MD  [facility]    Hugo Barrett is a 83 year old, presenting for the following health " issues:  Hospital F/U (05/03 - [facility])        5/8/2023     9:09 AM   Additional Questions   Roomed by Cindi FRAUSTO CMA   Accompanied by None         Hospital Follow-up Visit:    Hospital/Nursing Home/IP Rehab Facility: [facility]  Date of Admission: 05/03/2023 - ER only  Date of Discharge: Not admitted.  Reason(s) for Admission: Dizziness.    Patient had CT angiogram.  No evidence of stroke.  Diagnosed with benign positional vertigo.  Started on meclizine.  Has been taking regularly and it does help but she would prefer not to stay on meclizine ongoing.  Has not fully resolved.  Blood pressure was elevated throughout her time in the ER as well.  She does check it at home occasionally and it is typically elevated.  Has not had a follow-up in past 18 months because medication has been refilled.  She thought someone would reach out if she had to be seen and notes that she would prefer not to follow-up at the doctor if possible    Was your hospitalization related to COVID-19? No   Problems taking medications regularly:  None  Medication changes since discharge: None  Problems adhering to non-medication therapy:  None    Summary of hospitalization:  CareEverywhere information obtained and reviewed  Diagnostic Tests/Treatments reviewed.  Follow up needed: Answers for HPI/ROS submitted by the patient on 5/8/2023  If you checked off any problems, how difficult have these problems made it for you to do your work, take care of things at home, or get along with other people?: Not difficult at all  PHQ9 TOTAL SCORE: 5      Other Healthcare Providers Involved in Patient s Care:         None  Update since discharge: improved but not resolved.         Plan of care communicated with patient               Review of Systems   Constitutional: Negative.    HENT: Negative.    Eyes: Negative.    Respiratory: Negative.    Cardiovascular: Negative.    Gastrointestinal: Negative.    Endocrine: Negative.    Breasts:  negative.     Genitourinary: Negative.    Musculoskeletal: Negative.    Skin: Negative.    Neurological: Positive for dizziness.   Hematological: Negative.    Psychiatric/Behavioral: Negative.             Objective    BP (!) 162/74   Pulse 92   Temp 97.9  F (36.6  C)   Resp 16   Wt 92.7 kg (204 lb 4.8 oz)   LMP  (LMP Unknown)   SpO2 98%   BMI 35.62 kg/m    Body mass index is 35.62 kg/m .  Physical Exam   GENERAL: healthy, alert and no distress  NEURO: Normal strength and tone, mentation intact and speech normal

## 2023-05-26 DIAGNOSIS — R42 VERTIGO: Primary | ICD-10-CM

## 2023-05-26 RX ORDER — MECLIZINE HYDROCHLORIDE 25 MG/1
25 TABLET ORAL PRN
Qty: 24 TABLET | Refills: 1 | Status: SHIPPED | OUTPATIENT
Start: 2023-05-26

## 2023-05-26 NOTE — TELEPHONE ENCOUNTER
Pt called to f/u on if ok to refill.  Last saw Dr. Macario on 5/8/2023 and did not need rx refilled at the time.  Please advise.

## 2023-05-26 NOTE — TELEPHONE ENCOUNTER
"Routing to Provider:   -Patient reported med on 5/8/2023 visit.  Not previously ordered by PCP nor diagnosis attached.   -Last office visit provider:  5/8/2023    Requested Prescriptions   Pending Prescriptions Disp Refills     meclizine (ANTIVERT) 25 MG tablet 24 tablet 1     Sig: Take 1 tablet (25 mg) by mouth as needed for dizziness        Antivertigo/Antiemetic Agents Passed - 5/26/2023  8:51 AM        Passed - Recent (12 mo) or future (30 days) visit within the authorizing provider's specialty     Patient has had an office visit with the authorizing provider or a provider within the authorizing providers department within the previous 12 mos or has a future within next 30 days. See \"Patient Info\" tab in inbasket, or \"Choose Columns\" in Meds & Orders section of the refill encounter.              Passed - Medication is active on med list        Passed - Patient is 18 years of age or older             Iris Singh RN 05/26/23 9:12 AM  "

## 2023-06-06 ENCOUNTER — LAB (OUTPATIENT)
Dept: LAB | Facility: CLINIC | Age: 83
End: 2023-06-06
Payer: COMMERCIAL

## 2023-06-06 DIAGNOSIS — I10 ESSENTIAL HYPERTENSION: ICD-10-CM

## 2023-06-06 DIAGNOSIS — E78.2 MIXED HYPERLIPIDEMIA: ICD-10-CM

## 2023-06-06 LAB
ANION GAP SERPL CALCULATED.3IONS-SCNC: 12 MMOL/L (ref 7–15)
BUN SERPL-MCNC: 23.9 MG/DL (ref 8–23)
CALCIUM SERPL-MCNC: 9.9 MG/DL (ref 8.8–10.2)
CHLORIDE SERPL-SCNC: 102 MMOL/L (ref 98–107)
CHOLEST SERPL-MCNC: 145 MG/DL
CREAT SERPL-MCNC: 1.21 MG/DL (ref 0.51–0.95)
DEPRECATED HCO3 PLAS-SCNC: 26 MMOL/L (ref 22–29)
GFR SERPL CREATININE-BSD FRML MDRD: 45 ML/MIN/1.73M2
GLUCOSE SERPL-MCNC: 123 MG/DL (ref 70–99)
HDLC SERPL-MCNC: 56 MG/DL
LDLC SERPL CALC-MCNC: 71 MG/DL
NONHDLC SERPL-MCNC: 89 MG/DL
POTASSIUM SERPL-SCNC: 4.4 MMOL/L (ref 3.4–5.3)
SODIUM SERPL-SCNC: 140 MMOL/L (ref 136–145)
TRIGL SERPL-MCNC: 91 MG/DL

## 2023-06-06 PROCEDURE — 36415 COLL VENOUS BLD VENIPUNCTURE: CPT

## 2023-06-06 PROCEDURE — 80048 BASIC METABOLIC PNL TOTAL CA: CPT

## 2023-06-06 PROCEDURE — 80061 LIPID PANEL: CPT

## 2023-06-13 ENCOUNTER — OFFICE VISIT (OUTPATIENT)
Dept: FAMILY MEDICINE | Facility: CLINIC | Age: 83
End: 2023-06-13
Payer: COMMERCIAL

## 2023-06-13 VITALS
RESPIRATION RATE: 16 BRPM | OXYGEN SATURATION: 98 % | HEIGHT: 64 IN | WEIGHT: 203.7 LBS | DIASTOLIC BLOOD PRESSURE: 78 MMHG | BODY MASS INDEX: 34.78 KG/M2 | TEMPERATURE: 98.6 F | HEART RATE: 66 BPM | SYSTOLIC BLOOD PRESSURE: 146 MMHG

## 2023-06-13 DIAGNOSIS — H81.10 BENIGN PAROXYSMAL POSITIONAL VERTIGO, UNSPECIFIED LATERALITY: ICD-10-CM

## 2023-06-13 DIAGNOSIS — I10 PRIMARY HYPERTENSION: ICD-10-CM

## 2023-06-13 DIAGNOSIS — E78.2 MIXED HYPERLIPIDEMIA: ICD-10-CM

## 2023-06-13 DIAGNOSIS — Z00.00 ENCOUNTER FOR MEDICARE ANNUAL WELLNESS EXAM: Primary | ICD-10-CM

## 2023-06-13 DIAGNOSIS — B00.1 RECURRENT HERPES LABIALIS: ICD-10-CM

## 2023-06-13 DIAGNOSIS — N18.31 CHRONIC KIDNEY DISEASE, STAGE 3A (H): ICD-10-CM

## 2023-06-13 PROCEDURE — G0439 PPPS, SUBSEQ VISIT: HCPCS | Performed by: FAMILY MEDICINE

## 2023-06-13 PROCEDURE — 99214 OFFICE O/P EST MOD 30 MIN: CPT | Mod: 25 | Performed by: FAMILY MEDICINE

## 2023-06-13 RX ORDER — VALACYCLOVIR HYDROCHLORIDE 1 G/1
1000 TABLET, FILM COATED ORAL 2 TIMES DAILY
Qty: 16 TABLET | Refills: 0 | Status: SHIPPED | OUTPATIENT
Start: 2023-06-13 | End: 2024-06-18

## 2023-06-13 RX ORDER — CHLORTHALIDONE 25 MG/1
25 TABLET ORAL DAILY
Qty: 90 TABLET | Refills: 3 | Status: SHIPPED | OUTPATIENT
Start: 2023-06-13 | End: 2024-06-17

## 2023-06-13 RX ORDER — SIMVASTATIN 40 MG
40 TABLET ORAL AT BEDTIME
Qty: 90 TABLET | Refills: 3 | Status: SHIPPED | OUTPATIENT
Start: 2023-06-13 | End: 2024-06-17

## 2023-06-13 ASSESSMENT — ENCOUNTER SYMPTOMS
HEARTBURN: 0
DIZZINESS: 0
HEMATOCHEZIA: 0
MYALGIAS: 0
ARTHRALGIAS: 0
NERVOUS/ANXIOUS: 0
ABDOMINAL PAIN: 0
JOINT SWELLING: 0
NAUSEA: 0
WEAKNESS: 0
PALPITATIONS: 0
HEADACHES: 0
EYE PAIN: 0
CHILLS: 0
HEMATURIA: 0
DYSURIA: 0
BREAST MASS: 0
SHORTNESS OF BREATH: 0
DIARRHEA: 0
FEVER: 0
CONSTIPATION: 0
COUGH: 0
SORE THROAT: 0
FREQUENCY: 0
PARESTHESIAS: 0

## 2023-06-13 ASSESSMENT — ACTIVITIES OF DAILY LIVING (ADL): CURRENT_FUNCTION: NO ASSISTANCE NEEDED

## 2023-06-13 NOTE — PROGRESS NOTES
"SUBJECTIVE:   Nena is a 83 year old who presents for Preventive Visit.      6/13/2023     1:46 PM   Additional Questions   Roomed by Shweta GOODWIN CMA   Accompanied by Self         6/13/2023     1:46 PM   Patient Reported Additional Medications   Patient reports taking the following new medications None     Are you in the first 12 months of your Medicare coverage?  No    Healthy Habits:     In general, how would you rate your overall health?  Good    Frequency of exercise:  1 day/week    Duration of exercise:  Less than 15 minutes    Do you usually eat at least 4 servings of fruit and vegetables a day, include whole grains    & fiber and avoid regularly eating high fat or \"junk\" foods?  No    Taking medications regularly:  Yes    Ability to successfully perform activities of daily living:  No assistance needed    Home Safety:  No safety concerns identified    Hearing Impairment:  No hearing concerns    In the past 6 months, have you been bothered by leaking of urine? Yes    In general, how would you rate your overall mental or emotional health?  Good      PHQ-2 Total Score: 2    Patient is here for chronic disease follow up     HTN      Current medications: Chlorthalidone, lisinopril    Medication side effects: None  Medication concerns: None  Talking medications as prescribed: Yes  Blood pressure controlled: Yes  Home monitor numbers: Does not check  End organ symptoms: Chronic kidney disease    HLD     Medication: Simvastatin  LDL: At goal  Myalgia: None    She has recently developed benign positional vertigo.  Has been seeing physical therapy.  She is improving.    Have you ever done Advance Care Planning? (For example, a Health Directive, POLST, or a discussion with a medical provider or your loved ones about your wishes): Yes, advance care planning is on file.      Right Ear:      1000 Hz RESPONSE- on Level:   20 db  (Conditioning sound)   1000 Hz: RESPONSE- on Level:   20 db    2000 Hz: RESPONSE- on Level:   20 db "    4000 Hz: RESPONSE- on Level:   20 db     Left Ear:      4000 Hz: RESPONSE- on Level:   20 db    2000 Hz: RESPONSE- on Level:   20 db    1000 Hz: RESPONSE- on Level:   20 db     500 Hz: RESPONSE- on Level:   20 db     Right Ear:    500 Hz: RESPONSE- on Level:   20 db     Hearing Acuity: Pass    Hearing Assessment: normal   Fall risk  Fallen 2 or more times in the past year?: No  Any fall with injury in the past year?: No    Cognitive Screening   1) Repeat 3 items (Leader, Season, Table)    2) Clock draw: NORMAL  3) 3 item recall: Recalls 2 objects   Results: NORMAL clock, 1-2 items recalled: COGNITIVE IMPAIRMENT LESS LIKELY    Mini-CogTM Copyright S Halie. Licensed by the author for use in [facility]; reprinted with permission (soangelika@[facility]). All rights reserved.          Reviewed and updated as needed this visit by clinical staff   Tobacco  Allergies  Meds              Reviewed and updated as needed this visit by Provider                 Social History     Tobacco Use     Smoking status: Former     Types: Cigarettes     Quit date: 1968     Years since quittin.8     Passive exposure: Past     Smokeless tobacco: Never   Vaping Use     Vaping status: Never Used     Passive vaping exposure: Yes   Substance Use Topics     Alcohol use: Not on file             2023     1:37 PM   Alcohol Use   Prescreen: >3 drinks/day or >7 drinks/week? Not Applicable          View : No data to display.              Do you have a current opioid prescription? No  Do you use any other controlled substances or medications that are not prescribed by a provider? None      Current providers sharing in care for this patient include:   Patient Care Team:  Jason Barth MD as PCP - General (Family Medicine)  Rochelle Macario MD as Assigned PCP    The following health maintenance items are reviewed in Epic and correct as of today:  Health Maintenance   Topic Date Due     ANNUAL REVIEW OF HM ORDERS  Never  "done     ADVANCE CARE PLANNING  Never done     DTAP/TDAP/TD IMMUNIZATION (2 - Td or Tdap) 01/01/2015     MEDICARE ANNUAL WELLNESS VISIT  11/04/2022     COVID-19 Vaccine (6 - Moderna series) 01/29/2023     FALL RISK ASSESSMENT  06/13/2024     DEXA  10/15/2028     PHQ-2 (once per calendar year)  Completed     INFLUENZA VACCINE  Completed     Pneumococcal Vaccine: 65+ Years  Completed     ZOSTER IMMUNIZATION  Completed     IPV IMMUNIZATION  Aged Out     MENINGITIS IMMUNIZATION  Aged Out     Labs reviewed in EPIC        Pertinent mammograms are reviewed under the imaging tab.    Review of Systems   Constitutional: Negative for chills and fever.   HENT: Negative for congestion, ear pain, hearing loss and sore throat.    Eyes: Negative for pain and visual disturbance.   Respiratory: Negative for cough and shortness of breath.    Cardiovascular: Negative for chest pain, palpitations and peripheral edema.   Gastrointestinal: Negative for abdominal pain, constipation, diarrhea, heartburn, hematochezia and nausea.   Breasts:  Negative for tenderness, breast mass and discharge.   Genitourinary: Negative for dysuria, frequency, genital sores, hematuria, pelvic pain, urgency, vaginal bleeding and vaginal discharge.   Musculoskeletal: Negative for arthralgias, joint swelling and myalgias.   Skin: Negative for rash.   Neurological: Negative for dizziness, weakness, headaches and paresthesias.   Psychiatric/Behavioral: Negative for mood changes. The patient is not nervous/anxious.          OBJECTIVE:   BP (!) 151/79 (BP Location: Right arm, Patient Position: Sitting, Cuff Size: Adult Large)   Pulse 65   Temp 98.6  F (37  C) (Tympanic)   Resp 16   Ht 1.613 m (5' 3.5\")   Wt 92.4 kg (203 lb 11.2 oz)   LMP  (LMP Unknown)   SpO2 98%   BMI 35.52 kg/m   Estimated body mass index is 35.52 kg/m  as calculated from the following:    Height as of this encounter: 1.613 m (5' 3.5\").    Weight as of this encounter: 92.4 kg (203 lb 11.2 " oz).  Physical Exam  GENERAL: healthy, alert and no distress  EYES: Eyes grossly normal to inspection, PERRL and conjunctivae and sclerae normal  HENT: ear canals and TM's normal, nose and mouth without ulcers or lesions  NECK: no adenopathy, no asymmetry, masses, or scars and thyroid normal to palpation  RESP: lungs clear to auscultation - no rales, rhonchi or wheezes  CV: regular rate and rhythm, normal S1 S2, no S3 or S4, no murmur, click or rub, no peripheral edema and peripheral pulses strong  MS: no gross musculoskeletal defects noted, no edema  PSYCH: mentation appears normal, affect normal/bright    Diagnostic Test Results:  Labs reviewed in Epic    ASSESSMENT / PLAN:   Nena was seen today for wellness visit.    Diagnoses and all orders for this visit:    Encounter for Medicare annual wellness exam  -     PRIMARY CARE FOLLOW-UP SCHEDULING; Future    Mixed hyperlipidemia  Controlled, continue current medication  -     simvastatin (ZOCOR) 40 MG tablet; Take 1 tablet (40 mg) by mouth At Bedtime    Primary hypertension  Controlled, continue current medication  -     chlorthalidone (HYGROTON) 25 MG tablet; Take 1 tablet (25 mg) by mouth daily    Benign paroxysmal positional vertigo, unspecified laterality   Continue with therapy and intermittent use of meclizine    Chronic kidney disease, stage 3a (H)   Continue monitoring kidney function    Recurrent herpes labialis  -     valACYclovir (VALTREX) 1000 mg tablet; Take 1 tablet (1,000 mg) by mouth 2 times daily for 1 day    Other orders  -     REVIEW OF HEALTH MAINTENANCE PROTOCOL ORDERS        Patient has been advised of split billing requirements and indicates understanding: Yes      COUNSELING:  Reviewed preventive health counseling, as reflected in patient instructions       Regular exercise       Healthy diet/nutrition       Vision screening       Hearing screening       Dental care       Bladder control       Fall risk prevention       Osteoporosis  prevention/bone health       Advanced Planning         She reports that she quit smoking about 54 years ago. Her smoking use included cigarettes. She has been exposed to tobacco smoke. She has never used smokeless tobacco.      Appropriate preventive services were discussed with this patient, including applicable screening as appropriate for cardiovascular disease, diabetes, osteopenia/osteoporosis, and glaucoma.  As appropriate for age/gender, discussed screening for colorectal cancer, prostate cancer, breast cancer, and cervical cancer. Checklist reviewing preventive services available has been given to the patient.    Reviewed patients plan of care and provided an AVS. The Basic Care Plan (routine screening as documented in Health Maintenance) for Nena meets the Care Plan requirement. This Care Plan has been established and reviewed with the Patient.          Jason Barth MD  [facility]    Identified Health Risks:    I have reviewed Opioid Use Disorder and Substance Use Disorder risk factors and made any needed referrals.

## 2023-06-13 NOTE — PROGRESS NOTES
"    She is at risk for lack of exercise and has been provided with information to increase physical activity for the benefit of her well-being.  The patient was counseled and encouraged to consider modifying their diet and eating habits. She was provided with information on recommended healthy diet options.  Information on urinary incontinence and treatment options given to patient.  Answers for HPI/ROS submitted by the patient on 6/13/2023  In general, how would you rate your overall physical health?: good  Frequency of exercise:: 1 day/week  Do you usually eat at least 4 servings of fruit and vegetables a day, include whole grains & fiber, and avoid regularly eating high fat or \"junk\" foods? : No  Taking medications regularly:: Yes  Activities of Daily Living: no assistance needed  Home safety: no safety concerns identified  Hearing Impairment:: no hearing concerns  In the past 6 months, have you been bothered by leaking of urine?: Yes  abdominal pain: No  Blood in stool: No  Blood in urine: No  chest pain: No  chills: No  congestion: No  constipation: No  cough: No  diarrhea: No  dizziness: No  ear pain: No  eye pain: No  nervous/anxious: No  fever: No  frequency: No  genital sores: No  headaches: No  hearing loss: No  heartburn: No  arthralgias: No  joint swelling: No  peripheral edema: No  mood changes: No  myalgias: No  nausea: No  dysuria: No  palpitations: No  Skin sensation changes: No  sore throat: No  urgency: No  rash: No  shortness of breath: No  visual disturbance: No  weakness: No  pelvic pain: No  vaginal bleeding: No  vaginal discharge: No  tenderness: No  breast mass: No  breast discharge: No  In general, how would you rate your overall mental or emotional health?: good  Duration of exercise:: Less than 15 minutes      "

## 2023-06-13 NOTE — PATIENT INSTRUCTIONS
Patient Education   Personalized Prevention Plan  You are due for the preventive services outlined below.  Your care team is available to assist you in scheduling these services.  If you have already completed any of these items, please share that information with your care team to update in your medical record.  Health Maintenance Due   Topic Date Due     ANNUAL REVIEW OF HM ORDERS  Never done     Diptheria Tetanus Pertussis (DTAP/TDAP/TD) Vaccine (2 - Td or Tdap) 01/01/2015     COVID-19 Vaccine (6 - Moderna series) 01/29/2023       Exercise for a Healthier Heart  You may wonder how you can improve the health of your heart. If you re thinking about exercise, you re on the right track. You don t need to become an athlete. But you do need a certain amount of brisk exercise to help strengthen your heart. If you have been diagnosed with a heart condition, your healthcare provider may advise exercise to help your condition. To help make exercise a habit, choose safe, fun activities.      Exercise with a friend. When activity is fun, you're more likely to stick with it.     Before you start  Check with your healthcare provider before starting an exercise program. This is especially important if you haven't been active for a while. It's also important if you have a long-term (chronic) health problem such as heart disease, diabetes, or obesity. Also check with your provider if you're at high risk for having these problems.   Why exercise?  Exercising regularly offers many healthy rewards. It can help you do all of these:     Improve your blood cholesterol level to help prevent further heart trouble.    Lower your blood pressure to help prevent a stroke or heart attack.    Control diabetes or reduce your risk of getting this disease.    Improve your heart and lung function.    Reach and stay at a healthy weight.    Make your muscles stronger so you can stay active.    Prevent falls and fractures by slowing the loss of  bone mass (osteoporosis).    Manage stress better.    Improve your sense of self and your body image.  Exercise tips      Ease into your routine. Set small goals. Then build on them. Talk with your healthcare provider first before starting an exercise routine if you're not sure what your activity level should be.    Exercise on most days. Aim for a total of at least 150 minutes (2 hours and 30 minutes) or more of moderate-intensity aerobic activity each week. You could also do 75 minutes (1 hour and 15 minutes) or more of vigorous-intensity aerobic activity each week. Or try for a combination of both. Moderate activity means that you breathe heavier and your heart rate increases, but you can still talk. Think about doing at least 30 minutes of moderate exercise, 5 times a week. It's OK to work up to the 30-minute period over time. Examples of moderate-intensity activity are brisk walking, gardening, and water aerobics.    Step up your daily activity level.  Along with your exercise program, try being more active the whole day. Walk instead of drive. Or park further away so that you take more steps each day. Do more household tasks or yard work. You may not be able to meet the advised amount of physical activity. But doing some moderate- or vigorous-intensity aerobic activity can help reduce your risk for heart disease. Your healthcare provider can help you figure out what is best for you.    Choose 1 or more activities you enjoy.  Walking is one of the easiest things you can do. You can also try swimming, riding a bike, dancing, or taking an exercise class.    Call 911  Call 911 right away if any of these occur:     Chest pain that doesn't go away quickly with rest    New burning, tightness, pressure, or heaviness in your chest, neck, shoulders, back, or arms    Abnormal or severe shortness of breath    A very fast or irregular heartbeat (palpitations)    Fainting  When to call your healthcare provider  Call your  healthcare provider if you have any of these:     Dizziness or lightheadedness    Mild shortness of breath or chest pain    Increased or new joint or muscle pain    [employer] last reviewed this educational content on 7/1/2022 2000-2022 [employer]. All rights reserved. This information is not intended as a substitute for professional medical care. Always follow your healthcare professional's instructions.          Understanding USDA MyPlate  The USDA has guidelines to help you make healthy food choices. These are called MyPlate. MyPlate shows the food groups that make up healthy meals using the image of a place setting. Before you eat, think about the healthiest choices for what to put on your plate or in your cup or bowl. To learn more about building a healthy plate, visit www.choosemyplate.gov.     The food groups    Fruits. Any fruit or 100% fruit juice counts as part of the Fruit Group. Fruits may be fresh, canned, frozen, or dried, and may be whole, cut-up, or pureed. Make 1/2 of your plate fruits and vegetables.    Vegetables. Any vegetable or 100% vegetable juice counts as a member of the Vegetable Group. Vegetables may be fresh, frozen, canned, or dried. They can be served raw or cooked and may be whole, cut-up, or mashed. Make 1/2 of your plate fruits and vegetables.    Grains. All foods made from grains are part of the Grains Group. These include wheat, rice, oats, cornmeal, and barley. Grains are often used to make foods such as bread, pasta, oatmeal, cereal, tortillas, and grits. Grains should be no more than 1/4 of your plate. At least half of your grains should be whole grains.    Protein. This group includes meat, poultry, seafood, beans and peas, eggs, processed soy products (such as tofu), nuts (including nut butters), and seeds. Make protein choices no more than 1/4 of your plate. Meat and poultry choices should be lean or low fat.    Dairy. The Dairy Group includes all fluid milk  products and foods made from milk that contain calcium, such as yogurt and cheese. (Foods that have little calcium, such as cream, butter, and cream cheese, are not part of this group.) Most dairy choices should be low-fat or fat-free.    Oils. Oils aren't a food group, but they do contain essential nutrients. However it's important to watch your intake of oils. These are fats that are liquid at room temperature. They include canola, corn, olive, soybean, vegetable, and sunflower oil. Foods that are mainly oil include mayonnaise, certain salad dressings, and soft margarines. You likely already get your daily oil allowance from the foods you eat.  Things to limit  Eating healthy also means limiting these things in your diet:    Salt (sodium). Many processed foods have a lot of sodium. To keep sodium intake down, eat fresh vegetables, meats, poultry, and seafood when possible. Purchase low-sodium, reduced-sodium, or no-salt-added food products at the store. And don't add salt to your meals at home. Instead, season them with herbs and spices such as dill, oregano, cumin, and paprika. Or try adding flavor with lemon or lime zest and juice.    Saturated fat. Saturated fats are most often found in animal products such as beef, pork, and chicken. They are often solid at room temperature, such as butter. To reduce your saturated fat intake, choose leaner cuts of meat and poultry. And try healthier cooking methods such as grilling, broiling, roasting, or baking. For a simple lower-fat swap, use plain nonfat yogurt instead of mayonnaise when making potato salad or macaroni salad.    Added sugars. These are sugars added to foods. They are in foods such as ice cream, candy, soda, fruit drinks, sports drinks, energy drinks, cookies, pastries, jams, and syrups. Cut down on added sugars by sharing sweet treats with a family member or friend. You can also choose fruit for dessert, and drink water or other unsweetened  [employer] last reviewed this educational content on 6/1/2020 2000-2022 [employer]. All rights reserved. This information is not intended as a substitute for professional medical care. Always follow your healthcare professional's instructions.          Urinary Incontinence, Female (Adult)   Urinary incontinence means loss of bladder control. This problem affects many women, especially as they get older. If you have incontinence, you may be embarrassed to ask for help. But know that this problem can be treated.   Types of Incontinence  There are different types of incontinence. Two of the main types are described here. You can have more than one type.     Stress incontinence. With this type, urine leaks when pressure (stress) is put on the bladder. This may happen when you cough, sneeze, or laugh. Stress incontinence most often occurs because the pelvic floor muscles that support the bladder and urethra are weak. This can happen after pregnancy and vaginal childbirth or a hysterectomy. It can also be due to excess body weight or hormone changes.    Urge incontinence (also called overactive bladder). With this type, a sudden urge to urinate is felt often. This may happen even though there may not be much urine in the bladder. The need to urinate often during the night is common. Urge incontinence most often occurs because of bladder spasms. This may be due to bladder irritation or infection. Damage to bladder nerves or pelvic muscles, constipation, and certain medicines can also lead to urge incontinence.  Treatment depends on the cause. Further evaluation is needed to find the type you have. This will likely include an exam and certain tests. Based on the results, you and your healthcare provider can then plan treatment. Until a diagnosis is made, the home care tips below can help ease symptoms.   Home care    Do pelvic floor muscle exercises, if they are prescribed. The pelvic floor  muscles help support the bladder and urethra. Many women find that their symptoms improve when doing special exercises that strengthen these muscles. To do the exercises, contract the muscles you would use to stop your stream of urine. But do this when you re not urinating. Hold for 10 seconds, then relax. Repeat 10 to 20 times in a row, at least 3 times a day. Your healthcare provider may give you other instructions for how to do the exercises and how often.    Keep a bladder diary. This helps track how often and how much you urinate over a set period of time. Bring this diary with you to your next visit with the provider. The information can help your provider learn more about your bladder problem.    Lose weight, if advised to by your provider. Extra weight puts pressure on the bladder. Your provider can help you create a weight-loss plan that s right for you. This may include exercising more and making certain diet changes.    Don't have foods and drinks that may irritate the bladder. These can include alcohol and caffeinated drinks.    Quit smoking. Smoking and other tobacco use can lead to a long-term (chronic) cough that strains the pelvic floor muscles. Smoking may also damage the bladder and urethra. Talk with your provider about treatments or methods you can use to quit smoking.    If drinking large amounts of fluid makes you have symptoms, you may be advised to limit your fluid intake. You may also be advised to drink most of your fluids during the day and to limit fluids at night.    If you re worried about urine leakage or accidents, you may wear absorbent pads to catch urine. Change the pads often. This helps reduce discomfort. It may also reduce the risk of skin or bladder infections.    Follow-up care  Follow up with your healthcare provider, or as directed. It may take some to find the right treatment for your problem. But healthy lifestyle changes can be made right away. These include such things  as exercising on a regular basis, eating a healthy diet, losing weight (if needed), and quitting smoking. Your treatment plan may include special therapies or medicines. Certain procedures or surgery may also be options. Talk about any questions you have with your provider.   When to seek medical advice  Call the healthcare provider right away if any of these occur:    Fever of 100.4 F (38 C) or higher, or as directed by your provider    Bladder pain or fullness    Belly swelling    Nausea or vomiting    Back pain    Weakness, dizziness, or fainting  [employer] last reviewed this educational content on 1/1/2020 2000-2022 [employer]. All rights reserved. This information is not intended as a substitute for professional medical care. Always follow your healthcare professional's instructions.

## 2023-06-21 DIAGNOSIS — I10 PRIMARY HYPERTENSION: ICD-10-CM

## 2023-06-21 RX ORDER — LISINOPRIL 20 MG/1
TABLET ORAL
Qty: 90 TABLET | Refills: 0 | Status: SHIPPED | OUTPATIENT
Start: 2023-06-21 | End: 2024-06-18 | Stop reason: DRUGHIGH

## 2023-06-21 NOTE — TELEPHONE ENCOUNTER
Routing refill request to provider for review/approval because:  LOV 6/13/2023    ACE Inhibitors (Including Combos) Protocol Failed     Blood pressure under 140/90 in past 12 months    Normal serum creatinine on file in past 12 months        BRUNO Zheng  [facility]

## 2023-08-01 DIAGNOSIS — I10 PRIMARY HYPERTENSION: ICD-10-CM

## 2023-08-02 RX ORDER — LISINOPRIL 30 MG/1
TABLET ORAL
Qty: 90 TABLET | Refills: 0 | Status: SHIPPED | OUTPATIENT
Start: 2023-08-02 | End: 2023-11-14

## 2023-08-02 NOTE — TELEPHONE ENCOUNTER
Routing refill request to provider for review/approval because:  LOV 6/13/2023    ACE Inhibitors (Including Combos) Protocol Failed    Blood pressure under 140/90 in past 12 months    Normal serum creatinine on file in past 12 months        BRUNO Zheng  [facility]

## 2023-11-14 DIAGNOSIS — I10 PRIMARY HYPERTENSION: ICD-10-CM

## 2023-11-14 RX ORDER — LISINOPRIL 30 MG/1
TABLET ORAL
Qty: 90 TABLET | Refills: 0 | Status: SHIPPED | OUTPATIENT
Start: 2023-11-14 | End: 2024-02-09

## 2024-02-08 DIAGNOSIS — I10 PRIMARY HYPERTENSION: ICD-10-CM

## 2024-02-09 RX ORDER — LISINOPRIL 30 MG/1
TABLET ORAL
Qty: 90 TABLET | Refills: 0 | Status: SHIPPED | OUTPATIENT
Start: 2024-02-09 | End: 2024-05-06

## 2024-05-04 DIAGNOSIS — I10 PRIMARY HYPERTENSION: ICD-10-CM

## 2024-05-06 RX ORDER — LISINOPRIL 30 MG/1
TABLET ORAL
Qty: 90 TABLET | Refills: 0 | Status: SHIPPED | OUTPATIENT
Start: 2024-05-06 | End: 2024-06-18

## 2024-06-11 ENCOUNTER — LAB (OUTPATIENT)
Dept: LAB | Facility: CLINIC | Age: 84
End: 2024-06-11
Payer: COMMERCIAL

## 2024-06-11 DIAGNOSIS — E78.2 MIXED HYPERLIPIDEMIA: ICD-10-CM

## 2024-06-11 DIAGNOSIS — N18.31 CHRONIC KIDNEY DISEASE, STAGE 3A (H): Primary | ICD-10-CM

## 2024-06-11 DIAGNOSIS — Z13.220 SCREENING FOR HYPERLIPIDEMIA: ICD-10-CM

## 2024-06-11 LAB
ANION GAP SERPL CALCULATED.3IONS-SCNC: 7 MMOL/L (ref 7–15)
BUN SERPL-MCNC: 29.5 MG/DL (ref 8–23)
CALCIUM SERPL-MCNC: 9.8 MG/DL (ref 8.8–10.2)
CHLORIDE SERPL-SCNC: 107 MMOL/L (ref 98–107)
CHOLEST SERPL-MCNC: 151 MG/DL
CREAT SERPL-MCNC: 1.37 MG/DL (ref 0.51–0.95)
CREAT UR-MCNC: 68.5 MG/DL
DEPRECATED HCO3 PLAS-SCNC: 28 MMOL/L (ref 22–29)
EGFRCR SERPLBLD CKD-EPI 2021: 38 ML/MIN/1.73M2
FASTING STATUS PATIENT QL REPORTED: YES
FASTING STATUS PATIENT QL REPORTED: YES
GLUCOSE SERPL-MCNC: 115 MG/DL (ref 70–99)
HDLC SERPL-MCNC: 54 MG/DL
HGB BLD-MCNC: 12.7 G/DL (ref 11.7–15.7)
LDLC SERPL CALC-MCNC: 72 MG/DL
MICROALBUMIN UR-MCNC: <12 MG/L
MICROALBUMIN/CREAT UR: NORMAL MG/G{CREAT}
NONHDLC SERPL-MCNC: 97 MG/DL
POTASSIUM SERPL-SCNC: 4.9 MMOL/L (ref 3.4–5.3)
SODIUM SERPL-SCNC: 142 MMOL/L (ref 135–145)
TRIGL SERPL-MCNC: 126 MG/DL

## 2024-06-11 PROCEDURE — 36415 COLL VENOUS BLD VENIPUNCTURE: CPT

## 2024-06-11 PROCEDURE — 85018 HEMOGLOBIN: CPT | Mod: QW

## 2024-06-11 PROCEDURE — 80048 BASIC METABOLIC PNL TOTAL CA: CPT

## 2024-06-11 PROCEDURE — 82570 ASSAY OF URINE CREATININE: CPT

## 2024-06-11 PROCEDURE — 80061 LIPID PANEL: CPT

## 2024-06-11 PROCEDURE — 82043 UR ALBUMIN QUANTITATIVE: CPT

## 2024-06-15 DIAGNOSIS — I10 PRIMARY HYPERTENSION: ICD-10-CM

## 2024-06-15 DIAGNOSIS — E78.2 MIXED HYPERLIPIDEMIA: ICD-10-CM

## 2024-06-17 RX ORDER — SIMVASTATIN 40 MG
40 TABLET ORAL AT BEDTIME
Qty: 90 TABLET | Refills: 3 | Status: SHIPPED | OUTPATIENT
Start: 2024-06-17

## 2024-06-17 RX ORDER — CHLORTHALIDONE 25 MG/1
25 TABLET ORAL DAILY
Qty: 90 TABLET | Refills: 3 | Status: SHIPPED | OUTPATIENT
Start: 2024-06-17

## 2024-06-18 ENCOUNTER — OFFICE VISIT (OUTPATIENT)
Dept: FAMILY MEDICINE | Facility: CLINIC | Age: 84
End: 2024-06-18
Payer: COMMERCIAL

## 2024-06-18 ENCOUNTER — DOCUMENTATION ONLY (OUTPATIENT)
Dept: OTHER | Facility: CLINIC | Age: 84
End: 2024-06-18

## 2024-06-18 VITALS
DIASTOLIC BLOOD PRESSURE: 62 MMHG | HEART RATE: 58 BPM | RESPIRATION RATE: 16 BRPM | OXYGEN SATURATION: 96 % | BODY MASS INDEX: 36.34 KG/M2 | WEIGHT: 205.1 LBS | HEIGHT: 63 IN | TEMPERATURE: 98.1 F | SYSTOLIC BLOOD PRESSURE: 128 MMHG

## 2024-06-18 DIAGNOSIS — B00.1 RECURRENT HERPES LABIALIS: ICD-10-CM

## 2024-06-18 DIAGNOSIS — E66.01 MORBID OBESITY (H): ICD-10-CM

## 2024-06-18 DIAGNOSIS — Z00.00 ENCOUNTER FOR MEDICARE ANNUAL WELLNESS EXAM: Primary | ICD-10-CM

## 2024-06-18 DIAGNOSIS — I10 PRIMARY HYPERTENSION: ICD-10-CM

## 2024-06-18 DIAGNOSIS — N18.31 CHRONIC KIDNEY DISEASE, STAGE 3A (H): ICD-10-CM

## 2024-06-18 PROCEDURE — G0439 PPPS, SUBSEQ VISIT: HCPCS | Performed by: FAMILY MEDICINE

## 2024-06-18 PROCEDURE — 99214 OFFICE O/P EST MOD 30 MIN: CPT | Mod: 25 | Performed by: FAMILY MEDICINE

## 2024-06-18 RX ORDER — VALACYCLOVIR HYDROCHLORIDE 1 G/1
1000 TABLET, FILM COATED ORAL 2 TIMES DAILY
Qty: 16 TABLET | Refills: 0 | Status: SHIPPED | OUTPATIENT
Start: 2024-06-18

## 2024-06-18 RX ORDER — LISINOPRIL 40 MG/1
40 TABLET ORAL DAILY
Qty: 90 TABLET | Refills: 3 | Status: SHIPPED | OUTPATIENT
Start: 2024-06-18

## 2024-06-18 SDOH — HEALTH STABILITY: PHYSICAL HEALTH: ON AVERAGE, HOW MANY DAYS PER WEEK DO YOU ENGAGE IN MODERATE TO STRENUOUS EXERCISE (LIKE A BRISK WALK)?: 1 DAY

## 2024-06-18 ASSESSMENT — SOCIAL DETERMINANTS OF HEALTH (SDOH): HOW OFTEN DO YOU GET TOGETHER WITH FRIENDS OR RELATIVES?: ONCE A WEEK

## 2024-06-18 NOTE — PROGRESS NOTES
"Preventive Care Visit  [facility]  Jason Barth MD, Family Medicine  Jun 18, 2024      Assessment & Plan     Encounter for Medicare annual wellness exam    Discussed health maintenance, follow-up, routine testing, vaccines up-to-date    Recurrent herpes labialis  Treated with intermittent valacyclovir.  She says this works quite well.  - valACYclovir (VALTREX) 1000 mg tablet; Take 1 tablet (1,000 mg) by mouth 2 times daily    Primary hypertension  Not controlled, increase lisinopril to 40 mg daily  - lisinopril (ZESTRIL) 40 MG tablet; Take 1 tablet (40 mg) by mouth daily    Morbid obesity (H)  Discussed appropriate diet, activity level    Chronic kidney disease, stage 3a (H)  Stable, continue monitoring kidney function, no microalbuminuria    Patient has been advised of split billing requirements and indicates understanding: Yes        BMI  Estimated body mass index is 36.33 kg/m  as calculated from the following:    Height as of this encounter: 1.6 m (5' 3\").    Weight as of this encounter: 93 kg (205 lb 1.6 oz).       Counseling  Appropriate preventive services were discussed with this patient, including applicable screening as appropriate for fall prevention, nutrition, physical activity, Tobacco-use cessation, weight loss and cognition.  Checklist reviewing preventive services available has been given to the patient.  Reviewed patient's diet, addressing concerns and/or questions.   She is at risk for lack of exercise and has been provided with information to increase physical activity for the benefit of her well-being.   The patient was instructed to see the dentist every 6 months.   Information on urinary incontinence and treatment options given to patient.           Hugo Barrett is a 84 year old, presenting for the following:  Wellness Visit (AWV)        6/18/2024     8:41 AM   Additional Questions   Roomed by Shweta GOODWIN CMA   Accompanied by Self         Health Care " Directive  Patient does not have a Health Care Directive or Living Will: Advance Directive received and scanned. Click on Code in the patient header to view.    HPI  Patient is here for Medicare wellness exam and follow-up on chronic disease.  Patient is here for chronic disease follow up       HTN      Current medications: Lisinopril, chlorthalidone   medication side effects: None   Medication concerns: None  Talking medications as prescribed: Yes  Blood pressure controlled: No  Home monitor numbers: Elevated  End organ symptoms: CKD    HLD     Medication: Simvastatin  LDL: At goal  Myalgia: No    Chronic kidney disease is stable          6/18/2024   General Health   How would you rate your overall physical health? Good   Feel stress (tense, anxious, or unable to sleep) To some extent   (!) STRESS CONCERN      6/18/2024   Nutrition   Diet: Regular (no restrictions)         6/18/2024   Exercise   Days per week of moderate/strenous exercise 1 day   (!) EXERCISE CONCERN      6/18/2024   Social Factors   Frequency of gathering with friends or relatives Once a week   Worry food won't last until get money to buy more No   Food not last or not have enough money for food? No   Do you have housing?  Yes   Are you worried about losing your housing? No   Lack of transportation? No   Unable to get utilities (heat,electricity)? No         6/18/2024   Fall Risk   Fallen 2 or more times in the past year? No   Trouble with walking or balance? No          6/18/2024   Activities of Daily Living- Home Safety   Needs help with the following daily activites None of the above   Safety concerns in the home None of the above         6/18/2024   Dental   Dentist two times every year? (!) NO         6/18/2024   Hearing Screening   Hearing concerns? None of the above         6/18/2024   Driving Risk Screening   Patient/family members have concerns about driving No         6/18/2024   General Alertness/Fatigue Screening   Have you been more  tired than usual lately? No         2024   Urinary Incontinence Screening   Bothered by leaking urine in past 6 months Yes         2024   TB Screening   Were you born outside of the [place]? No         Today's PHQ-2 Score:       2024     8:40 AM   PHQ-2 (  [employer])   Q1: Little interest or pleasure in doing things 0   Q2: Feeling down, depressed or hopeless 1   PHQ-2 Score 1   Q1: Little interest or pleasure in doing things Not at all   Q2: Feeling down, depressed or hopeless Several days   PHQ-2 Score 1           2024   Substance Use   Alcohol more than 3/day or more than 7/wk No   Do you have a current opioid prescription? No   How severe/bad is pain from 1 to 10? 0/10 (No Pain)   Do you use any other substances recreationally? No     Social History     Tobacco Use    Smoking status: Former     Current packs/day: 0.00     Types: Cigarettes     Quit date: 1968     Years since quittin.8     Passive exposure: Past    Smokeless tobacco: Never   Vaping Use    Vaping status: Never Used                        Reviewed and updated as needed this visit by Provider                      Current providers sharing in care for this patient include:  Patient Care Team:  Jason Barth MD as PCP - General (Family Medicine)  Jason Barth MD as Assigned PCP    The following health maintenance items are reviewed in Epic and correct as of today:  Health Maintenance   Topic Date Due    URINALYSIS  Never done    COVID-19 Vaccine ( season) 2024    ANNUAL REVIEW OF HM ORDERS  2024    MEDICARE ANNUAL WELLNESS VISIT  2024    BMP  2025    LIPID  2025    MICROALBUMIN  2025    HEMOGLOBIN  2025    FALL RISK ASSESSMENT  2025    ADVANCE CARE PLANNING  2028    DEXA  10/15/2028    DTAP/TDAP/TD IMMUNIZATION (3 - Td or Tdap) 06/15/2033    PHQ-2 (once per calendar year)  Completed    INFLUENZA VACCINE  Completed    Pneumococcal Vaccine: 65+ Years   "Completed    ZOSTER IMMUNIZATION  Completed    RSV VACCINE (Pregnancy & 60+)  Completed    IPV IMMUNIZATION  Aged Out    HPV IMMUNIZATION  Aged Out    MENINGITIS IMMUNIZATION  Aged Out    RSV MONOCLONAL ANTIBODY  Aged Out         Review of Systems  Constitutional, HEENT, cardiovascular, pulmonary, GI, , musculoskeletal, neuro, skin, endocrine and psych systems are negative, except as otherwise noted.     Objective    Exam  BP (!) 161/64 (BP Location: Right arm, Patient Position: Sitting, Cuff Size: Adult Large)   Pulse 64   Temp 98.1  F (36.7  C) (Tympanic)   Resp 16   Ht 1.6 m (5' 3\")   Wt 93 kg (205 lb 1.6 oz)   LMP  (LMP Unknown)   SpO2 96%   BMI 36.33 kg/m     Estimated body mass index is 36.33 kg/m  as calculated from the following:    Height as of this encounter: 1.6 m (5' 3\").    Weight as of this encounter: 93 kg (205 lb 1.6 oz).    Physical Exam  GENERAL: alert and no distress  NECK: no adenopathy, no asymmetry, masses, or scars  RESP: lungs clear to auscultation - no rales, rhonchi or wheezes  CV: regular rate and rhythm, normal S1 S2, no S3 or S4, no murmur, click or rub, no peripheral edema  ABDOMEN: soft, nontender, no hepatosplenomegaly, no masses and bowel sounds normal  MS: no gross musculoskeletal defects noted, no edema        6/18/2024   Mini Cog   Clock Draw Score 0 Abnormal   3 Item Recall 2 objects recalled   Mini Cog Total Score 2              Signed Electronically by: Jason Barth MD    "

## 2025-05-19 ENCOUNTER — PATIENT OUTREACH (OUTPATIENT)
Dept: CARE COORDINATION | Facility: CLINIC | Age: 85
End: 2025-05-19
Payer: COMMERCIAL

## 2025-05-29 DIAGNOSIS — I10 PRIMARY HYPERTENSION: ICD-10-CM

## 2025-05-29 RX ORDER — LISINOPRIL 40 MG/1
40 TABLET ORAL DAILY
Qty: 90 TABLET | Refills: 0 | Status: SHIPPED | OUTPATIENT
Start: 2025-05-29

## 2025-06-10 DIAGNOSIS — E78.2 MIXED HYPERLIPIDEMIA: ICD-10-CM

## 2025-06-10 DIAGNOSIS — I10 PRIMARY HYPERTENSION: ICD-10-CM

## 2025-06-10 RX ORDER — SIMVASTATIN 40 MG
40 TABLET ORAL AT BEDTIME
Qty: 90 TABLET | Refills: 0 | Status: SHIPPED | OUTPATIENT
Start: 2025-06-10

## 2025-06-11 RX ORDER — CHLORTHALIDONE 25 MG/1
25 TABLET ORAL DAILY
Qty: 90 TABLET | Refills: 3 | Status: SHIPPED | OUTPATIENT
Start: 2025-06-11

## 2025-06-12 ENCOUNTER — RESULTS FOLLOW-UP (OUTPATIENT)
Dept: FAMILY MEDICINE | Facility: CLINIC | Age: 85
End: 2025-06-12

## 2025-06-12 ENCOUNTER — LAB (OUTPATIENT)
Dept: LAB | Facility: CLINIC | Age: 85
End: 2025-06-12
Payer: COMMERCIAL

## 2025-06-12 DIAGNOSIS — N18.31 CHRONIC KIDNEY DISEASE, STAGE 3A (H): Primary | ICD-10-CM

## 2025-06-12 DIAGNOSIS — E78.2 MIXED HYPERLIPIDEMIA: ICD-10-CM

## 2025-06-12 DIAGNOSIS — Z13.6 SCREENING FOR CARDIOVASCULAR CONDITION: ICD-10-CM

## 2025-06-12 LAB
ANION GAP SERPL CALCULATED.3IONS-SCNC: 10 MMOL/L (ref 7–15)
BUN SERPL-MCNC: 27.1 MG/DL (ref 8–23)
CALCIUM SERPL-MCNC: 10.1 MG/DL (ref 8.8–10.4)
CHLORIDE SERPL-SCNC: 104 MMOL/L (ref 98–107)
CHOLEST SERPL-MCNC: 150 MG/DL
CREAT SERPL-MCNC: 1.59 MG/DL (ref 0.51–0.95)
CREAT UR-MCNC: 92.7 MG/DL
EGFRCR SERPLBLD CKD-EPI 2021: 32 ML/MIN/1.73M2
FASTING STATUS PATIENT QL REPORTED: YES
FASTING STATUS PATIENT QL REPORTED: YES
GLUCOSE SERPL-MCNC: 109 MG/DL (ref 70–99)
HCO3 SERPL-SCNC: 26 MMOL/L (ref 22–29)
HDLC SERPL-MCNC: 52 MG/DL
HGB BLD-MCNC: 12.7 G/DL (ref 11.7–15.7)
LDLC SERPL CALC-MCNC: 69 MG/DL
MCV RBC AUTO: 94 FL (ref 78–100)
MICROALBUMIN UR-MCNC: <12 MG/L
MICROALBUMIN/CREAT UR: NORMAL MG/G{CREAT}
NONHDLC SERPL-MCNC: 98 MG/DL
POTASSIUM SERPL-SCNC: 4.5 MMOL/L (ref 3.4–5.3)
SODIUM SERPL-SCNC: 140 MMOL/L (ref 135–145)
TRIGL SERPL-MCNC: 146 MG/DL

## 2025-06-12 PROCEDURE — 82043 UR ALBUMIN QUANTITATIVE: CPT | Performed by: FAMILY MEDICINE

## 2025-06-12 PROCEDURE — 80048 BASIC METABOLIC PNL TOTAL CA: CPT | Performed by: FAMILY MEDICINE

## 2025-06-12 PROCEDURE — 80061 LIPID PANEL: CPT | Performed by: FAMILY MEDICINE

## 2025-06-19 ENCOUNTER — OFFICE VISIT (OUTPATIENT)
Dept: FAMILY MEDICINE | Facility: CLINIC | Age: 85
End: 2025-06-19
Payer: COMMERCIAL

## 2025-06-19 VITALS
OXYGEN SATURATION: 96 % | HEART RATE: 66 BPM | SYSTOLIC BLOOD PRESSURE: 150 MMHG | RESPIRATION RATE: 16 BRPM | HEIGHT: 63 IN | TEMPERATURE: 97.8 F | DIASTOLIC BLOOD PRESSURE: 70 MMHG | BODY MASS INDEX: 35.07 KG/M2 | WEIGHT: 197.9 LBS

## 2025-06-19 DIAGNOSIS — I10 PRIMARY HYPERTENSION: ICD-10-CM

## 2025-06-19 DIAGNOSIS — E66.01 MORBID OBESITY (H): ICD-10-CM

## 2025-06-19 DIAGNOSIS — E78.2 MIXED HYPERLIPIDEMIA: ICD-10-CM

## 2025-06-19 DIAGNOSIS — B00.1 RECURRENT HERPES LABIALIS: ICD-10-CM

## 2025-06-19 DIAGNOSIS — F43.21 GRIEVING: ICD-10-CM

## 2025-06-19 DIAGNOSIS — Z00.00 ENCOUNTER FOR MEDICARE ANNUAL WELLNESS EXAM: Primary | ICD-10-CM

## 2025-06-19 DIAGNOSIS — N18.32 CHRONIC KIDNEY DISEASE, STAGE 3B (H): ICD-10-CM

## 2025-06-19 RX ORDER — AMLODIPINE BESYLATE 5 MG/1
5 TABLET ORAL DAILY
Qty: 30 TABLET | Refills: 5 | Status: SHIPPED | OUTPATIENT
Start: 2025-06-19

## 2025-06-19 RX ORDER — SIMVASTATIN 40 MG
40 TABLET ORAL AT BEDTIME
Qty: 90 TABLET | Refills: 2 | Status: SHIPPED | OUTPATIENT
Start: 2025-06-19 | End: 2025-06-19 | Stop reason: ALTCHOICE

## 2025-06-19 RX ORDER — VALACYCLOVIR HYDROCHLORIDE 1 G/1
1000 TABLET, FILM COATED ORAL 2 TIMES DAILY
Qty: 16 TABLET | Refills: 0 | Status: SHIPPED | OUTPATIENT
Start: 2025-06-19

## 2025-06-19 RX ORDER — AMLODIPINE BESYLATE 5 MG/1
5 TABLET ORAL DAILY
Qty: 30 TABLET | Refills: 5 | Status: SHIPPED | OUTPATIENT
Start: 2025-06-19 | End: 2025-06-19

## 2025-06-19 RX ORDER — ATORVASTATIN CALCIUM 20 MG/1
20 TABLET, FILM COATED ORAL DAILY
Qty: 90 TABLET | Refills: 3 | Status: SHIPPED | OUTPATIENT
Start: 2025-06-19

## 2025-06-19 RX ORDER — LISINOPRIL 40 MG/1
40 TABLET ORAL DAILY
Qty: 90 TABLET | Refills: 2 | Status: SHIPPED | OUTPATIENT
Start: 2025-06-19

## 2025-06-19 RX ORDER — AMLODIPINE BESYLATE 5 MG/1
5 TABLET ORAL DAILY
Qty: 90 TABLET | Refills: 1 | Status: SHIPPED | OUTPATIENT
Start: 2025-06-19

## 2025-06-19 SDOH — HEALTH STABILITY: PHYSICAL HEALTH: ON AVERAGE, HOW MANY DAYS PER WEEK DO YOU ENGAGE IN MODERATE TO STRENUOUS EXERCISE (LIKE A BRISK WALK)?: 2 DAYS

## 2025-06-19 ASSESSMENT — SOCIAL DETERMINANTS OF HEALTH (SDOH): HOW OFTEN DO YOU GET TOGETHER WITH FRIENDS OR RELATIVES?: THREE TIMES A WEEK

## 2025-06-19 NOTE — PROGRESS NOTES
"Preventive Care Visit  [facility]  Jason Barth MD, Family Medicine  Jun 19, 2025      Assessment & Plan     Encounter for Medicare annual wellness exam  We have discussed health maintenance, routine follow-up, immunizations, heart healthy diet, regular activity, weight maintenance.     Recurrent herpes labialis  Managed with intermittent valacyclovir  - valACYclovir (VALTREX) 1000 mg tablet; Take 1 tablet (1,000 mg) by mouth 2 times daily.    Mixed hyperlipidemia  Controlled, change to atorvastatin due to addition of amlodipine  - atorvastatin (LIPITOR) 20 MG tablet; Take 1 tablet (20 mg) by mouth daily.    Primary hypertension  Not well controlled  Add amlodipine 5 mg daily  - lisinopril (ZESTRIL) 40 MG tablet; Take 1 tablet (40 mg) by mouth daily.    Chronic kidney disease, stage 3b (H)  stable    Morbid obesity (H)  Discussed healthy diet    Grieving   recently passed away      The longitudinal plan of care for the diagnosis(es)/condition(s) as documented were addressed during this visit. Due to the added complexity in care, I will continue to support Nena in the subsequent management and with ongoing continuity of care.    Patient has been advised of split billing requirements and indicates understanding: Yes        BMI  Estimated body mass index is 34.78 kg/m  as calculated from the following:    Height as of this encounter: 1.607 m (5' 3.25\").    Weight as of this encounter: 89.8 kg (197 lb 14.4 oz).       Counseling  Appropriate preventive services were addressed with this patient via screening, questionnaire, or discussion as appropriate for fall prevention, nutrition, physical activity, Tobacco-use cessation, social engagement, weight loss and cognition.  Checklist reviewing preventive services available has been given to the patient.  Reviewed patient's diet, addressing concerns and/or questions.   She is at risk for lack of exercise and has been provided with " information to increase physical activity for the benefit of her well-being.   The patient was instructed to see the dentist every 6 months.   Information on urinary incontinence and treatment options given to patient.             Hugo Barrett is a 85 year old, presenting for the following:  Wellness Visit (AWV)        6/19/2025     9:08 AM   Additional Questions   Roomed by Shweta GOODWIN CMA   Accompanied by self         HPI  Patient is here for annual wellness visit.  She has been feeling fairly well.  Her blood pressure has been a bit elevated.  Her  passed away a few months ago and she is still grieving.  He had been in assisted living for some time.  Her hyperlipidemia has been stable.  Chronic kidney disease stable.         Advance Care Planning    Patient states has Health Care Directive and will send to [employer].        6/19/2025   General Health   How would you rate your overall physical health? Good   Feel stress (tense, anxious, or unable to sleep) Only a little   (!) STRESS CONCERN      6/19/2025   Nutrition   Diet: Regular (no restrictions)         6/19/2025   Exercise   Days per week of moderate/strenous exercise 2 days   (!) EXERCISE CONCERN      6/19/2025   Social Factors   Frequency of gathering with friends or relatives Three times a week   Worry food won't last until get money to buy more No   Food not last or not have enough money for food? No   Do you have housing? (Housing is defined as stable permanent housing and does not include staying outside in a car, in a tent, in an abandoned building, in an overnight shelter, or couch-surfing.) Yes   Are you worried about losing your housing? No   Lack of transportation? No   Unable to get utilities (heat,electricity)? No         6/19/2025   Fall Risk   Fallen 2 or more times in the past year? No   Trouble with walking or balance? Yes   Gait Speed Test Interpretation Less than or equal to 5.00 seconds - PASS           6/19/2025    Activities of Daily Living- Home Safety   Needs help with the following daily activites None of the above   Safety concerns in the home None of the above         2025   Dental   Dentist two times every year? (!) NO         2025   Hearing Screening   Hearing concerns? None of the above         2025   Driving Risk Screening   Patient/family members have concerns about driving No         2025   General Alertness/Fatigue Screening   Have you been more tired than usual lately? No         2025   Urinary Incontinence Screening   Bothered by leaking urine in past 6 months Yes         Today's PHQ-2 Score:       2025     9:13 AM   PHQ-2 (  [employer])   Q1: Little interest or pleasure in doing things 0   Q2: Feeling down, depressed or hopeless 1   PHQ-2 Score 1    Q1: Little interest or pleasure in doing things Not at all   Q2: Feeling down, depressed or hopeless Several days   PHQ-2 Score 1       Patient-reported           2025   Substance Use   Alcohol more than 3/day or more than 7/wk No   Do you have a current opioid prescription? No   How severe/bad is pain from 1 to 10? 0/10 (No Pain)   Do you use any other substances recreationally? (!) DECLINE     Social History     Tobacco Use    Smoking status: Former     Current packs/day: 0.00     Types: Cigarettes     Quit date: 1968     Years since quittin.8     Passive exposure: Past    Smokeless tobacco: Never   Vaping Use    Vaping status: Never Used                        Reviewed and updated as needed this visit by Provider                    No past medical history on file.  Past Surgical History:   Procedure Laterality Date    CATARACT EXTRACTION EXTRACAPSULAR W/ INTRAOCULAR LENS IMPLANTATION Left 10/22/2015    COLONOSCOPY  2011    Family hx colon polyps; Normal; repeat in 5 yrs    COLONOSCOPY  2005    Hyperplastic polyp; repeat in 5 yrs    WISDOM TOOTH EXTRACTION      No date given     Labs reviewed in EPIC  BP Readings  "from Last 3 Encounters:   06/19/25 (!) 150/70   06/18/24 128/62   06/13/23 (!) 146/78    Wt Readings from Last 3 Encounters:   06/19/25 89.8 kg (197 lb 14.4 oz)   06/18/24 93 kg (205 lb 1.6 oz)   06/13/23 92.4 kg (203 lb 11.2 oz)                  Current providers sharing in care for this patient include:  Patient Care Team:  Jason Barth MD as PCP - General (Family Medicine)  Jason Barth MD as Assigned PCP    The following health maintenance items are reviewed in Epic and correct as of today:  Health Maintenance   Topic Date Due    URINALYSIS  Never done    DEXA  10/15/2015    COVID-19 VACCINE (8 - 2024-25 season) 03/16/2025    ANNUAL REVIEW OF HM ORDERS  06/18/2025    MEDICARE ANNUAL WELLNESS VISIT  06/18/2025    BMP  06/12/2026    LIPID  06/12/2026    MICROALBUMIN  06/12/2026    HEMOGLOBIN  06/12/2026    FALL RISK ASSESSMENT  06/19/2026    ADVANCE CARE PLANNING  06/18/2029    DTAP/TDAP/TD VACCINE (3 - Td or Tdap) 06/15/2033    PHQ-2 (once per calendar year)  Completed    INFLUENZA VACCINE  Completed    PNEUMOCOCCAL VACCINE 50+ YEARS  Completed    ZOSTER VACCINE  Completed    RSV VACCINE  Completed    HPV VACCINE  Aged Out    MENINGITIS VACCINE  Aged Out         Review of Systems  Constitutional, neuro, ENT, endocrine, pulmonary, cardiac, gastrointestinal, genitourinary, musculoskeletal, integument and psychiatric systems are negative, except as otherwise noted.     Objective    Exam  BP (!) 181/73 (BP Location: Right arm, Patient Position: Sitting, Cuff Size: Adult Large)   Pulse 66   Temp 97.8  F (36.6  C) (Tympanic)   Resp 16   Ht 1.607 m (5' 3.25\")   Wt 89.8 kg (197 lb 14.4 oz)   LMP  (LMP Unknown)   SpO2 96%   BMI 34.78 kg/m     Estimated body mass index is 34.78 kg/m  as calculated from the following:    Height as of this encounter: 1.607 m (5' 3.25\").    Weight as of this encounter: 89.8 kg (197 lb 14.4 oz).    Physical Exam  GENERAL: alert and no distress  EYES: Eyes grossly normal " to inspection, PERRL and conjunctivae and sclerae normal  HENT: ear canals and TM's normal, nose and mouth without ulcers or lesions  NECK: no adenopathy, no asymmetry, masses, or scars  RESP: lungs clear to auscultation - no rales, rhonchi or wheezes  CV: regular rate and rhythm, normal S1 S2, no S3 or S4, no murmur, click or rub, no peripheral edema  ABDOMEN: soft, nontender, no hepatosplenomegaly, no masses and bowel sounds normal  MS: no gross musculoskeletal defects noted, no edema  PSYCH: mentation appears normal, affect normal/bright  Isabel horn nail left great toenail, this was ground down to a more acceptable size.        6/19/2025   Mini Cog   Mini-Cog Not Completed (choose reason) Patient declines       Patient declines, there are NO concerns for cognitive deficits.           Signed Electronically by: Jason Barth MD

## 2025-06-19 NOTE — PATIENT INSTRUCTIONS
Patient Education   Preventive Care Advice   This is general advice given by our system to help you stay healthy. However, your care team may have specific advice just for you. Please talk to your care team about your preventive care needs.  Nutrition  Eat 5 or more servings of fruits and vegetables each day.  Try wheat bread, brown rice and whole grain pasta (instead of white bread, rice, and pasta).  Get enough calcium and vitamin D. Check the label on foods and aim for 100% of the RDA (recommended daily allowance).  Lifestyle  Exercise at least 150 minutes each week  (30 minutes a day, 5 days a week).  Do muscle strengthening activities 2 days a week. These help control your weight and prevent disease.  No smoking.  Wear sunscreen to prevent skin cancer.  Have a dental exam and cleaning every 6 months.  Yearly exams  See your health care team every year to talk about:  Any changes in your health.  Any medicines your care team has prescribed.  Preventive care, family planning, and ways to prevent chronic diseases.  Shots (vaccines)   HPV shots (up to age 26), if you've never had them before.  Hepatitis B shots (up to age 59), if you've never had them before.  COVID-19 shot: Get this shot when it's due.  Flu shot: Get a flu shot every year.  Tetanus shot: Get a tetanus shot every 10 years.  Pneumococcal, hepatitis A, and RSV shots: Ask your care team if you need these based on your risk.  Shingles shot (for age 50 and up)  General health tests  Diabetes screening:  Starting at age 35, Get screened for diabetes at least every 3 years.  If you are younger than age 35, ask your care team if you should be screened for diabetes.  Cholesterol test: At age 39, start having a cholesterol test every 5 years, or more often if advised.  Bone density scan (DEXA): At age 50, ask your care team if you should have this scan for osteoporosis (brittle bones).  Hepatitis C: Get tested at least once in your life.  STIs (sexually  transmitted infections)  Before age 24: Ask your care team if you should be screened for STIs.  After age 24: Get screened for STIs if you're at risk. You are at risk for STIs (including HIV) if:  You are sexually active with more than one person.  You don't use condoms every time.  You or a partner was diagnosed with a sexually transmitted infection.  If you are at risk for HIV, ask about PrEP medicine to prevent HIV.  Get tested for HIV at least once in your life, whether you are at risk for HIV or not.  Cancer screening tests  Cervical cancer screening: If you have a cervix, begin getting regular cervical cancer screening tests starting at age 21.  Breast cancer scan (mammogram): If you've ever had breasts, begin having regular mammograms starting at age 40. This is a scan to check for breast cancer.  Colon cancer screening: It is important to start screening for colon cancer at age 45.  Have a colonoscopy test every 10 years (or more often if you're at risk) Or, ask your provider about stool tests like a FIT test every year or Cologuard test every 3 years.  To learn more about your testing options, visit:   .  For help making a decision, visit:   https://bit.ly/cp48502.  Prostate cancer screening test: If you have a prostate, ask your care team if a prostate cancer screening test (PSA) at age 55 is right for you.  Lung cancer screening: If you are a current or former smoker ages 50 to 80, ask your care team if ongoing lung cancer screenings are right for you.  For informational purposes only. Not to replace the advice of your health care provider. Copyright   2023 [facility]. All rights reserved. Clinically reviewed by the [facility]itions Program. [employer]gers[employer] 675377 - REV 01/24.  Eating Healthy Foods: Care Instructions  With every meal, you can make healthy food choices. Try to eat a variety of fruits, vegetables, whole grains, lean proteins, and low-fat dairy products. This can help  "you get the right balance of nutrients, including vitamins and minerals. Small changes add up over time. You can start by adding one healthy food to your meals each day.    Try to make half your plate fruits and vegetables, one-fourth whole grains, and one-fourth lean proteins. Try including dairy with your meals.   Eat more fruits and vegetables. Try to have them with most meals and snacks.   Foods for healthy eating        Fruits   These can be fresh, frozen, canned, or dried.  Try to choose whole fruit rather than fruit juice.  Eat a variety of colors.        Vegetables   These can be fresh, frozen, canned, or dried.  Beans, peas, and lentils count too.        Whole grains   Choose whole-grain breads, cereals, and noodles.  Try brown rice.        Lean proteins   These can include lean meat, poultry, fish, and eggs.  You can also have tofu, beans, peas, lentils, nuts, and seeds.        Dairy   Try milk, yogurt, and cheese.  Choose low-fat or fat-free when you can.  If you need to, use lactose-free milk or fortified plant-based milk products, such as soy milk.        Water   Drink water when you're thirsty.  Limit sugar-sweetened drinks, including soda, fruit drinks, and sports drinks.  Where can you learn more?  Go to https://www.[employer].net/patiented  Enter T756 in the search box to learn more about \"Eating Healthy Foods: Care Instructions.\"  Current as of: October 7, 2024  Content Version: 14.5 2024-2025 [employer].   Care instructions adapted under license by your healthcare professional. If you have questions about a medical condition or this instruction, always ask your healthcare professional. [employer] disclaims any warranty or liability for your use of this information.    Nutrition for Older Adults: Care Instructions  Overview     Good nutrition is important at any age. But it is especially important for older adults. Eating healthy foods helps keep your body strong. And it " can help lower your risk for disease.  As you get older, your body needs more of certain nutrients. These include vitamin B12, calcium, and vitamin D. But it may be harder for you to get these and other important nutrients. This could be for many reasons. You may not feel as hungry as you used to. Or you could have problems with your teeth or mouth that make it hard to chew. Or you may not enjoy planning and preparing meals, especially if you live alone.  Talk with your doctor if you want help getting the most nutrition from what you eat. They may have you work with a dietitian to help you plan meals.  Follow-up care is a key part of your treatment and safety. Be sure to make and go to all appointments, and call your doctor if you are having problems. It's also a good idea to know your test results and keep a list of the medicines you take.  How can you care for yourself at home?  To stay healthy  Eat a variety of foods. The more you vary the foods you eat, the more vitamins, minerals, and other nutrients you get.  Ask your doctor if you should take a multivitamin. Choose one with about 100% of the daily value (DV) for vitamins and minerals. Do not take more than 100% of the daily value for any vitamin or mineral unless your doctor tells you to. Talk with your doctor if you are not sure which multivitamin is right for you.  Try to eat lots of fruits and vegetables. Fresh or frozen vegetables and fruits are healthy choices. Choose canned vegetables that have no salt added and fruits that are canned in their own juice or light syrup.  Include foods that are high in vitamin B12 in your diet. Good choices are fortified breakfast cereal, nonfat or low-fat milk and other dairy products, meat, poultry, fish, and eggs.  Get enough calcium and vitamin D. Good choices include nonfat or low-fat milk, cheese, and yogurt. Other good options are tofu, orange juice with added calcium, and some leafy green vegetables, such as  yolanda greens and kale. If you don't use milk products, talk to your doctor about calcium and vitamin D supplements.  Try to eat protein foods every day. Good choices include lean meat, fish, poultry, eggs, and cheese. Other good options are cooked beans, peanut butter, and nuts and seeds.  Choose whole grains for half of the grains you eat. Look for 100% whole wheat bread, whole-grain cereals, brown rice, and other whole grains.  If you have constipation  Eat high-fiber foods every day if you can. These include fruits, vegetables, cooked dried beans, and whole grains.  Drink plenty of fluids. If you have kidney, heart, or liver disease and have to limit fluids, talk with your doctor before you increase the amount of fluids you drink.  Ask your doctor if stool softeners may help keep your bowels regular.  If you have mouth problems that make chewing hard  Pick canned or cooked fruits and vegetables. These are often softer.  Chop or shred meat, poultry, and fish. Add sauce or gravy to the meat to help keep it moist.  Pick other protein foods that are soft. These include cheese, peanut butter, cooked beans, cottage cheese, and eggs.  If you have trouble shopping for yourself  Ask a local food store to deliver groceries to your home.  Contact your local area agency on aging and ask about resources that can help.  Ask a family member or neighbor to help you.  If you have trouble preparing meals  Try easier cooking methods such as using a slow cooker or microwave oven.  Let the grocery store do some of the work for you. Look for precut, washed, and ready-to-eat foods.  Take part in group meal programs. You can find these through senior citizen programs.  Have meals brought to your home. Your community may offer programs that deliver meals, such as Meals on Wheels. Or you could use an online meal delivery service.  If you are able, take a cooking class.  If your appetite is poor  Try to eat meals on a regular schedule.  "It may help to eat smaller meals more often throughout the day.  If you can, eat some meals with other people. You could ask family or friends to eat with you. Or you could take part in group meal programs offered in your community.  Ask your doctor if your medicines could cause appetite or taste problems. If so, ask about changing medicines.  Add spices and herbs to increase the flavor of food.  If you think you are depressed, ask your doctor for help. Depression can affect your appetite. And it can make it hard to do everyday activities like grocery shopping and making meals. Treatment can help.  When should you call for help?  Watch closely for changes in your health, and be sure to contact your doctor if you have any problems.  Where can you learn more?  Go to https://www.Contactually.ePetWorld/patiented  Enter L643 in the search box to learn more about \"Nutrition for Older Adults: Care Instructions.\"  Current as of: October 25, 2024  Content Version: 14.5    9905-5830 [employer].   Care instructions adapted under license by your healthcare professional. If you have questions about a medical condition or this instruction, always ask your healthcare professional. [employer] disclaims any warranty or liability for your use of this information.    Preventing Falls: Care Instructions  Injuries and health problems such as trouble walking or poor eyesight can increase your risk of falling. So can some medicines. But there are things you can do to help prevent falls. You can exercise to get stronger. You can also arrange your home to make it safer.    Talk to your doctor about the medicines you take. Ask if any of them increase the risk of falls and whether they can be changed or stopped.   Try to exercise regularly. It can help improve your strength and balance. This can help lower your risk of falling.         Practice fall safety and prevention.   Wear low-heeled shoes that fit well and give your " "feet good support. Talk to your doctor if you have foot problems that make this hard.  Carry a cellphone or wear a medical alert device that you can use to call for help.  Use stepladders instead of chairs to reach high objects. Don't climb if you're at risk for falls. Ask for help, if needed.  Wear the correct eyeglasses, if you need them.        Make your home safer.   Remove rugs, cords, clutter, and furniture from walkways.  Keep your house well lit. Use night-lights in hallways and bathrooms.  Install and use sturdy handrails on stairways.  Wear nonskid footwear, even inside. Don't walk barefoot or in socks without shoes.        Be safe outside.   Use handrails, curb cuts, and ramps whenever possible.  Keep your hands free by using a shoulder bag or backpack.  Try to walk in well-lit areas. Watch out for uneven ground, changes in pavement, and debris.  Be careful in the winter. Walk on the grass or gravel when sidewalks are slippery. Use de-icer on steps and walkways. Add non-slip devices to shoes.    Put grab bars and nonskid mats in your shower or tub and near the toilet. Try to use a shower chair or bath bench when bathing.   Get into a tub or shower by putting in your weaker leg first. Get out with your strong side first. Have a phone or medical alert device in the bathroom with you.   Where can you learn more?  Go to https://www.Fundbox.net/patiented  Enter G117 in the search box to learn more about \"Preventing Falls: Care Instructions.\"  Current as of: July 31, 2024  Content Version: 14.5    2038-4441 [employer].   Care instructions adapted under license by your healthcare professional. If you have questions about a medical condition or this instruction, always ask your healthcare professional. [employer] disclaims any warranty or liability for your use of this information.    Bladder Training: Care Instructions  Your Care Instructions     Bladder training is used to treat urge " incontinence and stress incontinence. Urge incontinence means that the need to urinate comes on so fast that you can't get to a toilet in time. Stress incontinence means that you leak urine because of pressure on your bladder. For example, it may happen when you laugh, cough, or lift something heavy.  Bladder training can increase how long you can wait before you have to urinate. It can also help your bladder hold more urine. And it can give you better control over the urge to urinate.  It is important to remember that bladder training takes a few weeks to a few months to make a difference. You may not see results right away, but don't give up.  Follow-up care is a key part of your treatment and safety. Be sure to make and go to all appointments, and call your doctor if you are having problems. It's also a good idea to know your test results and keep a list of the medicines you take.  How can you care for yourself at home?  Work with your doctor to come up with a bladder training program that is right for you. You may use one or more of the following methods.  Delayed urination  In the beginning, try to keep from urinating for 5 minutes after you first feel the need to go.  While you wait, take deep, slow breaths to relax. Kegel exercises can also help you delay the need to go to the bathroom.  After some practice, when you can easily wait 5 minutes to urinate, try to wait 10 minutes before you urinate.  Slowly increase the waiting period until you are able to control when you have to urinate.  Scheduled urination  Empty your bladder when you first wake up in the morning.  Schedule times throughout the day when you will urinate.  Start by going to the bathroom every hour, even if you don't need to go.  Slowly increase the time between trips to the bathroom.  When you have found a schedule that works well for you, keep doing it.  If you wake up during the night and have to urinate, do it. Apply your schedule to  "waking hours only.  Kegel exercises  These tighten and strengthen pelvic muscles, which can help you control the flow of urine. (If doing these exercises causes pain, stop doing them and talk with your doctor.) To do Kegel exercises:  Squeeze your muscles as if you were trying not to pass gas. Or squeeze your muscles as if you were stopping the flow of urine. Your belly, legs, and buttocks shouldn't move.  Hold the squeeze for 3 seconds, then relax for 5 to 10 seconds.  Start with 3 seconds, then add 1 second each week until you are able to squeeze for 10 seconds.  Repeat the exercise 10 times a session. Do 3 to 8 sessions a day.  When should you call for help?  Watch closely for changes in your health, and be sure to contact your doctor if:    Your incontinence is getting worse.     You do not get better as expected.   Where can you learn more?  Go to https://www.Reimage.net/patiented  Enter V684 in the search box to learn more about \"Bladder Training: Care Instructions.\"  Current as of: April 30, 2024  Content Version: 14.5    9223-2681 [email or web address].   Care instructions adapted under license by your healthcare professional. If you have questions about a medical condition or this instruction, always ask your healthcare professional. [email or web address] disclaims any warranty or liability for your use of this information.    Eating Healthy Foods: Care Instructions  With every meal, you can make healthy food choices. Try to eat a variety of fruits, vegetables, whole grains, lean proteins, and low-fat dairy products. This can help you get the right balance of nutrients, including vitamins and minerals. Small changes add up over time. You can start by adding one healthy food to your meals each day.    Try to make half your plate fruits and vegetables, one-fourth whole grains, and one-fourth lean proteins. Try including dairy with your meals.   Eat more fruits and vegetables. Try to have them with most " "meals and snacks.   Foods for healthy eating        Fruits   These can be fresh, frozen, canned, or dried.  Try to choose whole fruit rather than fruit juice.  Eat a variety of colors.        Vegetables   These can be fresh, frozen, canned, or dried.  Beans, peas, and lentils count too.        Whole grains   Choose whole-grain breads, cereals, and noodles.  Try brown rice.        Lean proteins   These can include lean meat, poultry, fish, and eggs.  You can also have tofu, beans, peas, lentils, nuts, and seeds.        Dairy   Try milk, yogurt, and cheese.  Choose low-fat or fat-free when you can.  If you need to, use lactose-free milk or fortified plant-based milk products, such as soy milk.        Water   Drink water when you're thirsty.  Limit sugar-sweetened drinks, including soda, fruit drinks, and sports drinks.  Where can you learn more?  Go to https://www.[employer]oMed.net/patiented  Enter T756 in the search box to learn more about \"Eating Healthy Foods: Care Instructions.\"  Current as of: October 7, 2024  Content Version: 14.5 2024-2025 [email or web address].   Care instructions adapted under license by your healthcare professional. If you have questions about a medical condition or this instruction, always ask your healthcare professional. [email or web address] disclaims any warranty or liability for your use of this information.       "